# Patient Record
Sex: FEMALE | Race: WHITE | NOT HISPANIC OR LATINO | Employment: OTHER | URBAN - METROPOLITAN AREA
[De-identification: names, ages, dates, MRNs, and addresses within clinical notes are randomized per-mention and may not be internally consistent; named-entity substitution may affect disease eponyms.]

---

## 2020-08-05 ENCOUNTER — TRANSFERRED RECORDS (OUTPATIENT)
Dept: MAMMOGRAPHY | Facility: CLINIC | Age: 85
End: 2020-08-05

## 2020-08-07 ENCOUNTER — TRANSFERRED RECORDS (OUTPATIENT)
Dept: HEALTH INFORMATION MANAGEMENT | Facility: CLINIC | Age: 85
End: 2020-08-07

## 2020-08-07 ENCOUNTER — TRANSFERRED RECORDS (OUTPATIENT)
Dept: MAMMOGRAPHY | Facility: CLINIC | Age: 85
End: 2020-08-07

## 2020-08-12 ENCOUNTER — AMBULATORY - HEALTHEAST (OUTPATIENT)
Dept: SURGERY | Facility: CLINIC | Age: 85
End: 2020-08-12

## 2020-08-12 ENCOUNTER — AMBULATORY - HEALTHEAST (OUTPATIENT)
Dept: FAMILY MEDICINE | Facility: CLINIC | Age: 85
End: 2020-08-12

## 2020-08-12 DIAGNOSIS — Z11.59 ENCOUNTER FOR SCREENING FOR OTHER VIRAL DISEASES: ICD-10-CM

## 2020-08-12 LAB
SARS-COV-2 PCR COMMENT: NORMAL
SARS-COV-2 RNA SPEC QL NAA+PROBE: NEGATIVE
SARS-COV-2 VIRUS SPECIMEN SOURCE: NORMAL

## 2020-08-12 ASSESSMENT — MIFFLIN-ST. JEOR
SCORE: 1256.21
SCORE: 1251.68

## 2020-08-13 ENCOUNTER — SURGERY - HEALTHEAST (OUTPATIENT)
Dept: SURGERY | Facility: CLINIC | Age: 85
End: 2020-08-13

## 2020-08-13 ENCOUNTER — TRANSFERRED RECORDS (OUTPATIENT)
Dept: HEALTH INFORMATION MANAGEMENT | Facility: CLINIC | Age: 85
End: 2020-08-13

## 2020-08-13 ENCOUNTER — COMMUNICATION - HEALTHEAST (OUTPATIENT)
Dept: SCHEDULING | Facility: CLINIC | Age: 85
End: 2020-08-13

## 2020-08-13 ENCOUNTER — ANESTHESIA - HEALTHEAST (OUTPATIENT)
Dept: SURGERY | Facility: CLINIC | Age: 85
End: 2020-08-13

## 2020-08-13 ASSESSMENT — MIFFLIN-ST. JEOR: SCORE: 1266.19

## 2020-08-16 ENCOUNTER — HOME CARE/HOSPICE - HEALTHEAST (OUTPATIENT)
Dept: HOME HEALTH SERVICES | Facility: HOME HEALTH | Age: 85
End: 2020-08-16

## 2020-08-16 ASSESSMENT — MIFFLIN-ST. JEOR: SCORE: 1265.29

## 2020-08-17 ENCOUNTER — RECORDS - HEALTHEAST (OUTPATIENT)
Dept: ADMINISTRATIVE | Facility: OTHER | Age: 85
End: 2020-08-17

## 2020-08-20 ENCOUNTER — HOME CARE/HOSPICE - HEALTHEAST (OUTPATIENT)
Dept: HOME HEALTH SERVICES | Facility: HOME HEALTH | Age: 85
End: 2020-08-20

## 2020-08-20 ENCOUNTER — RECORDS - HEALTHEAST (OUTPATIENT)
Dept: LAB | Facility: CLINIC | Age: 85
End: 2020-08-20

## 2020-08-20 LAB
AST SERPL W P-5'-P-CCNC: 14 U/L (ref 0–40)
BASOPHILS # BLD AUTO: 0 THOU/UL (ref 0–0.2)
BASOPHILS NFR BLD AUTO: 0 % (ref 0–2)
BUN SERPL-MCNC: 9 MG/DL (ref 8–28)
C REACTIVE PROTEIN LHE: 3.3 MG/DL (ref 0–0.8)
CREAT SERPL-MCNC: 0.76 MG/DL (ref 0.6–1.1)
EOSINOPHIL # BLD AUTO: 0.2 THOU/UL (ref 0–0.4)
EOSINOPHIL NFR BLD AUTO: 2 % (ref 0–6)
ERYTHROCYTE [DISTWIDTH] IN BLOOD BY AUTOMATED COUNT: 12.7 % (ref 11–14.5)
ERYTHROCYTE [SEDIMENTATION RATE] IN BLOOD BY WESTERGREN METHOD: 58 MM/HR (ref 0–20)
GFR SERPL CREATININE-BSD FRML MDRD: >60 ML/MIN/1.73M2
HCT VFR BLD AUTO: 39.8 % (ref 35–47)
HGB BLD-MCNC: 12.7 G/DL (ref 12–16)
IMM GRANULOCYTES # BLD: 0.1 THOU/UL
IMM GRANULOCYTES NFR BLD: 1 %
LYMPHOCYTES # BLD AUTO: 1.6 THOU/UL (ref 0.8–4.4)
LYMPHOCYTES NFR BLD AUTO: 18 % (ref 20–40)
MCH RBC QN AUTO: 28.1 PG (ref 27–34)
MCHC RBC AUTO-ENTMCNC: 31.9 G/DL (ref 32–36)
MCV RBC AUTO: 88 FL (ref 80–100)
MONOCYTES # BLD AUTO: 1.1 THOU/UL (ref 0–0.9)
MONOCYTES NFR BLD AUTO: 12 % (ref 2–10)
NEUTROPHILS # BLD AUTO: 6.2 THOU/UL (ref 2–7.7)
NEUTROPHILS NFR BLD AUTO: 67 % (ref 50–70)
PLATELET # BLD AUTO: 288 THOU/UL (ref 140–440)
PMV BLD AUTO: 10.7 FL (ref 8.5–12.5)
RBC # BLD AUTO: 4.52 MILL/UL (ref 3.8–5.4)
WBC: 9.3 THOU/UL (ref 4–11)

## 2020-08-21 ENCOUNTER — HOME CARE/HOSPICE - HEALTHEAST (OUTPATIENT)
Dept: HOME HEALTH SERVICES | Facility: HOME HEALTH | Age: 85
End: 2020-08-21

## 2020-08-22 ENCOUNTER — HOME CARE/HOSPICE - HEALTHEAST (OUTPATIENT)
Dept: HOME HEALTH SERVICES | Facility: HOME HEALTH | Age: 85
End: 2020-08-22

## 2020-08-24 ENCOUNTER — HOME CARE/HOSPICE - HEALTHEAST (OUTPATIENT)
Dept: HOME HEALTH SERVICES | Facility: HOME HEALTH | Age: 85
End: 2020-08-24

## 2020-08-25 ENCOUNTER — HOME CARE/HOSPICE - HEALTHEAST (OUTPATIENT)
Dept: HOME HEALTH SERVICES | Facility: HOME HEALTH | Age: 85
End: 2020-08-25

## 2020-08-27 ENCOUNTER — HOME CARE/HOSPICE - HEALTHEAST (OUTPATIENT)
Dept: HOME HEALTH SERVICES | Facility: HOME HEALTH | Age: 85
End: 2020-08-27

## 2020-08-27 ENCOUNTER — RECORDS - HEALTHEAST (OUTPATIENT)
Dept: LAB | Facility: CLINIC | Age: 85
End: 2020-08-27

## 2020-08-27 LAB
AST SERPL W P-5'-P-CCNC: 12 U/L (ref 0–40)
BASOPHILS # BLD AUTO: 0.1 THOU/UL (ref 0–0.2)
BASOPHILS NFR BLD AUTO: 1 % (ref 0–2)
BUN SERPL-MCNC: 11 MG/DL (ref 8–28)
C REACTIVE PROTEIN LHE: 1.2 MG/DL (ref 0–0.8)
CREAT SERPL-MCNC: 0.74 MG/DL (ref 0.6–1.1)
EOSINOPHIL # BLD AUTO: 0.3 THOU/UL (ref 0–0.4)
EOSINOPHIL NFR BLD AUTO: 4 % (ref 0–6)
ERYTHROCYTE [DISTWIDTH] IN BLOOD BY AUTOMATED COUNT: 12.6 % (ref 11–14.5)
ERYTHROCYTE [SEDIMENTATION RATE] IN BLOOD BY WESTERGREN METHOD: 47 MM/HR (ref 0–20)
GFR SERPL CREATININE-BSD FRML MDRD: >60 ML/MIN/1.73M2
HCT VFR BLD AUTO: 42.6 % (ref 35–47)
HGB BLD-MCNC: 13.2 G/DL (ref 12–16)
IMM GRANULOCYTES # BLD: 0 THOU/UL
IMM GRANULOCYTES NFR BLD: 1 %
LYMPHOCYTES # BLD AUTO: 2.3 THOU/UL (ref 0.8–4.4)
LYMPHOCYTES NFR BLD AUTO: 28 % (ref 20–40)
MCH RBC QN AUTO: 27.7 PG (ref 27–34)
MCHC RBC AUTO-ENTMCNC: 31 G/DL (ref 32–36)
MCV RBC AUTO: 89 FL (ref 80–100)
MONOCYTES # BLD AUTO: 1 THOU/UL (ref 0–0.9)
MONOCYTES NFR BLD AUTO: 12 % (ref 2–10)
NEUTROPHILS # BLD AUTO: 4.6 THOU/UL (ref 2–7.7)
NEUTROPHILS NFR BLD AUTO: 55 % (ref 50–70)
PLATELET # BLD AUTO: 299 THOU/UL (ref 140–440)
PMV BLD AUTO: 10.3 FL (ref 8.5–12.5)
RBC # BLD AUTO: 4.77 MILL/UL (ref 3.8–5.4)
WBC: 8.4 THOU/UL (ref 4–11)

## 2020-08-28 ENCOUNTER — HOME CARE/HOSPICE - HEALTHEAST (OUTPATIENT)
Dept: HOME HEALTH SERVICES | Facility: HOME HEALTH | Age: 85
End: 2020-08-28

## 2020-08-29 ENCOUNTER — HOME CARE/HOSPICE - HEALTHEAST (OUTPATIENT)
Dept: HOME HEALTH SERVICES | Facility: HOME HEALTH | Age: 85
End: 2020-08-29

## 2020-08-31 ENCOUNTER — HOME CARE/HOSPICE - HEALTHEAST (OUTPATIENT)
Dept: HOME HEALTH SERVICES | Facility: HOME HEALTH | Age: 85
End: 2020-08-31

## 2020-09-02 ENCOUNTER — HOME CARE/HOSPICE - HEALTHEAST (OUTPATIENT)
Dept: HOME HEALTH SERVICES | Facility: HOME HEALTH | Age: 85
End: 2020-09-02

## 2020-09-03 ENCOUNTER — HOME CARE/HOSPICE - HEALTHEAST (OUTPATIENT)
Dept: HOME HEALTH SERVICES | Facility: HOME HEALTH | Age: 85
End: 2020-09-03

## 2020-09-03 ENCOUNTER — RECORDS - HEALTHEAST (OUTPATIENT)
Dept: LAB | Facility: CLINIC | Age: 85
End: 2020-09-03

## 2020-09-03 LAB
AST SERPL W P-5'-P-CCNC: 11 U/L (ref 0–40)
BASOPHILS # BLD AUTO: 0.1 THOU/UL (ref 0–0.2)
BASOPHILS NFR BLD AUTO: 1 % (ref 0–2)
BUN SERPL-MCNC: 12 MG/DL (ref 8–28)
C REACTIVE PROTEIN LHE: 0.6 MG/DL (ref 0–0.8)
CREAT SERPL-MCNC: 0.82 MG/DL (ref 0.6–1.1)
EOSINOPHIL # BLD AUTO: 0.2 THOU/UL (ref 0–0.4)
EOSINOPHIL NFR BLD AUTO: 4 % (ref 0–6)
ERYTHROCYTE [DISTWIDTH] IN BLOOD BY AUTOMATED COUNT: 12.6 % (ref 11–14.5)
ERYTHROCYTE [SEDIMENTATION RATE] IN BLOOD BY WESTERGREN METHOD: 41 MM/HR (ref 0–20)
GFR SERPL CREATININE-BSD FRML MDRD: >60 ML/MIN/1.73M2
HCT VFR BLD AUTO: 39.6 % (ref 35–47)
HGB BLD-MCNC: 12.6 G/DL (ref 12–16)
IMM GRANULOCYTES # BLD: 0 THOU/UL
IMM GRANULOCYTES NFR BLD: 1 %
LYMPHOCYTES # BLD AUTO: 1.8 THOU/UL (ref 0.8–4.4)
LYMPHOCYTES NFR BLD AUTO: 28 % (ref 20–40)
MCH RBC QN AUTO: 28 PG (ref 27–34)
MCHC RBC AUTO-ENTMCNC: 31.8 G/DL (ref 32–36)
MCV RBC AUTO: 88 FL (ref 80–100)
MONOCYTES # BLD AUTO: 1.2 THOU/UL (ref 0–0.9)
MONOCYTES NFR BLD AUTO: 19 % (ref 2–10)
NEUTROPHILS # BLD AUTO: 3 THOU/UL (ref 2–7.7)
NEUTROPHILS NFR BLD AUTO: 48 % (ref 50–70)
PLATELET # BLD AUTO: 322 THOU/UL (ref 140–440)
PMV BLD AUTO: 10.6 FL (ref 8.5–12.5)
RBC # BLD AUTO: 4.5 MILL/UL (ref 3.8–5.4)
WBC: 6.3 THOU/UL (ref 4–11)

## 2020-09-04 ENCOUNTER — COMMUNICATION - HEALTHEAST (OUTPATIENT)
Dept: INFECTIOUS DISEASES | Facility: CLINIC | Age: 85
End: 2020-09-04

## 2020-09-04 ENCOUNTER — HOME CARE/HOSPICE - HEALTHEAST (OUTPATIENT)
Dept: HOME HEALTH SERVICES | Facility: HOME HEALTH | Age: 85
End: 2020-09-04

## 2020-09-09 ENCOUNTER — HOME CARE/HOSPICE - HEALTHEAST (OUTPATIENT)
Dept: HOME HEALTH SERVICES | Facility: HOME HEALTH | Age: 85
End: 2020-09-09

## 2020-09-10 ENCOUNTER — RECORDS - HEALTHEAST (OUTPATIENT)
Dept: LAB | Facility: CLINIC | Age: 85
End: 2020-09-10

## 2020-09-10 ENCOUNTER — HOME CARE/HOSPICE - HEALTHEAST (OUTPATIENT)
Dept: HOME HEALTH SERVICES | Facility: HOME HEALTH | Age: 85
End: 2020-09-10

## 2020-09-10 LAB
AST SERPL W P-5'-P-CCNC: 13 U/L (ref 0–40)
BASOPHILS # BLD AUTO: 0.1 THOU/UL (ref 0–0.2)
BASOPHILS NFR BLD AUTO: 1 % (ref 0–2)
BUN SERPL-MCNC: 11 MG/DL (ref 8–28)
C REACTIVE PROTEIN LHE: 0.6 MG/DL (ref 0–0.8)
CREAT SERPL-MCNC: 0.77 MG/DL (ref 0.6–1.1)
EOSINOPHIL # BLD AUTO: 0.2 THOU/UL (ref 0–0.4)
EOSINOPHIL NFR BLD AUTO: 3 % (ref 0–6)
ERYTHROCYTE [DISTWIDTH] IN BLOOD BY AUTOMATED COUNT: 12.8 % (ref 11–14.5)
ERYTHROCYTE [SEDIMENTATION RATE] IN BLOOD BY WESTERGREN METHOD: 48 MM/HR (ref 0–20)
GFR SERPL CREATININE-BSD FRML MDRD: >60 ML/MIN/1.73M2
HCT VFR BLD AUTO: 39.7 % (ref 35–47)
HGB BLD-MCNC: 12.3 G/DL (ref 12–16)
IMM GRANULOCYTES # BLD: 0 THOU/UL
IMM GRANULOCYTES NFR BLD: 0 %
LYMPHOCYTES # BLD AUTO: 2.3 THOU/UL (ref 0.8–4.4)
LYMPHOCYTES NFR BLD AUTO: 32 % (ref 20–40)
MCH RBC QN AUTO: 27.2 PG (ref 27–34)
MCHC RBC AUTO-ENTMCNC: 31 G/DL (ref 32–36)
MCV RBC AUTO: 88 FL (ref 80–100)
MONOCYTES # BLD AUTO: 1.1 THOU/UL (ref 0–0.9)
MONOCYTES NFR BLD AUTO: 15 % (ref 2–10)
NEUTROPHILS # BLD AUTO: 3.5 THOU/UL (ref 2–7.7)
NEUTROPHILS NFR BLD AUTO: 49 % (ref 50–70)
PLATELET # BLD AUTO: 302 THOU/UL (ref 140–440)
PMV BLD AUTO: 10.7 FL (ref 8.5–12.5)
RBC # BLD AUTO: 4.52 MILL/UL (ref 3.8–5.4)
WBC: 7.2 THOU/UL (ref 4–11)

## 2020-09-11 ENCOUNTER — HOME CARE/HOSPICE - HEALTHEAST (OUTPATIENT)
Dept: HOME HEALTH SERVICES | Facility: HOME HEALTH | Age: 85
End: 2020-09-11

## 2020-09-14 ENCOUNTER — HOME CARE/HOSPICE - HEALTHEAST (OUTPATIENT)
Dept: HOME HEALTH SERVICES | Facility: HOME HEALTH | Age: 85
End: 2020-09-14

## 2020-09-21 ENCOUNTER — OFFICE VISIT - HEALTHEAST (OUTPATIENT)
Dept: INFECTIOUS DISEASES | Facility: CLINIC | Age: 85
End: 2020-09-21

## 2020-09-21 DIAGNOSIS — L08.9 LOCAL INFECTION OF WOUND: ICD-10-CM

## 2020-09-21 DIAGNOSIS — T14.8XXA LOCAL INFECTION OF WOUND: ICD-10-CM

## 2020-09-21 RX ORDER — TRAZODONE HYDROCHLORIDE 50 MG/1
TABLET, FILM COATED ORAL
Status: SHIPPED | COMMUNITY
Start: 2020-08-28 | End: 2022-09-01

## 2021-05-26 VITALS — SYSTOLIC BLOOD PRESSURE: 111 MMHG | TEMPERATURE: 97.2 F | DIASTOLIC BLOOD PRESSURE: 72 MMHG | HEART RATE: 89 BPM

## 2021-05-26 VITALS
OXYGEN SATURATION: 96 % | TEMPERATURE: 97.2 F | SYSTOLIC BLOOD PRESSURE: 132 MMHG | HEART RATE: 94 BPM | DIASTOLIC BLOOD PRESSURE: 84 MMHG

## 2021-05-26 VITALS
SYSTOLIC BLOOD PRESSURE: 121 MMHG | TEMPERATURE: 97.7 F | DIASTOLIC BLOOD PRESSURE: 82 MMHG | HEART RATE: 87 BPM | OXYGEN SATURATION: 98 %

## 2021-05-26 VITALS — DIASTOLIC BLOOD PRESSURE: 94 MMHG | TEMPERATURE: 97.3 F | HEART RATE: 76 BPM | SYSTOLIC BLOOD PRESSURE: 162 MMHG

## 2021-05-26 VITALS
OXYGEN SATURATION: 97 % | TEMPERATURE: 98.2 F | SYSTOLIC BLOOD PRESSURE: 158 MMHG | HEART RATE: 86 BPM | DIASTOLIC BLOOD PRESSURE: 78 MMHG

## 2021-05-26 VITALS — HEART RATE: 94 BPM | SYSTOLIC BLOOD PRESSURE: 105 MMHG | DIASTOLIC BLOOD PRESSURE: 67 MMHG

## 2021-05-26 VITALS
OXYGEN SATURATION: 94 % | DIASTOLIC BLOOD PRESSURE: 92 MMHG | SYSTOLIC BLOOD PRESSURE: 142 MMHG | RESPIRATION RATE: 16 BRPM | TEMPERATURE: 98.4 F | HEART RATE: 88 BPM

## 2021-05-26 VITALS — SYSTOLIC BLOOD PRESSURE: 128 MMHG | HEART RATE: 81 BPM | DIASTOLIC BLOOD PRESSURE: 84 MMHG

## 2021-05-27 VITALS
TEMPERATURE: 96.9 F | HEART RATE: 76 BPM | DIASTOLIC BLOOD PRESSURE: 80 MMHG | OXYGEN SATURATION: 94 % | SYSTOLIC BLOOD PRESSURE: 128 MMHG

## 2021-05-27 VITALS
HEART RATE: 74 BPM | DIASTOLIC BLOOD PRESSURE: 92 MMHG | TEMPERATURE: 96.4 F | OXYGEN SATURATION: 96 % | RESPIRATION RATE: 16 BRPM | SYSTOLIC BLOOD PRESSURE: 182 MMHG

## 2021-05-27 VITALS
DIASTOLIC BLOOD PRESSURE: 73 MMHG | TEMPERATURE: 96.3 F | HEART RATE: 93 BPM | RESPIRATION RATE: 16 BRPM | OXYGEN SATURATION: 95 % | SYSTOLIC BLOOD PRESSURE: 117 MMHG

## 2021-05-27 VITALS
OXYGEN SATURATION: 98 % | DIASTOLIC BLOOD PRESSURE: 85 MMHG | TEMPERATURE: 81 F | HEART RATE: 98 BPM | SYSTOLIC BLOOD PRESSURE: 144 MMHG

## 2021-05-27 VITALS
DIASTOLIC BLOOD PRESSURE: 78 MMHG | TEMPERATURE: 97.6 F | OXYGEN SATURATION: 96 % | SYSTOLIC BLOOD PRESSURE: 130 MMHG | HEART RATE: 80 BPM

## 2021-05-27 VITALS
HEART RATE: 82 BPM | OXYGEN SATURATION: 94 % | DIASTOLIC BLOOD PRESSURE: 88 MMHG | TEMPERATURE: 96.5 F | SYSTOLIC BLOOD PRESSURE: 140 MMHG

## 2021-05-27 VITALS
OXYGEN SATURATION: 95 % | TEMPERATURE: 96.3 F | RESPIRATION RATE: 20 BRPM | HEART RATE: 73 BPM | DIASTOLIC BLOOD PRESSURE: 82 MMHG | SYSTOLIC BLOOD PRESSURE: 132 MMHG

## 2021-06-04 VITALS — WEIGHT: 187 LBS | HEIGHT: 64 IN | BODY MASS INDEX: 31.92 KG/M2

## 2021-06-10 NOTE — ANESTHESIA CARE TRANSFER NOTE
Last vitals:   Vitals:    08/13/20 1811   BP: 114/56   Pulse: 70   Resp: 16   Temp: 36.9  C (98.4  F)   SpO2: 100%     Patient's level of consciousness is drowsy  Spontaneous respirations: yes  Maintains airway independently: yes  Dentition unchanged: yes  Oropharynx: oropharynx clear of all foreign objects    QCDR Measures:  ASA# 20 - Surgical Safety Checklist: WHO surgical safety checklist completed prior to induction    PQRS# 430 - Adult PONV Prevention: 4558F - Pt received => 2 anti-emetic agents (different classes) preop & intraop  ASA# 8 - Peds PONV Prevention: NA - Not pediatric patient, not GA or 2 or more risk factors NOT present  PQRS# 424 - Deanna-op Temp Management: 4559F - At least one body temp DOCUMENTED => 35.5C or 95.9F within required timeframe  PQRS# 426 - PACU Transfer Protocol: - Transfer of care checklist used  ASA# 14 - Acute Post-op Pain: ASA14B - Patient did NOT experience pain >= 7 out of 10

## 2021-06-10 NOTE — ANESTHESIA PREPROCEDURE EVALUATION
Anesthesia Evaluation      Patient summary reviewed   History of anesthetic complications (PONV)     Airway   Mallampati: II  Neck ROM: full   Pulmonary - negative ROS and normal exam   (+) asthma  mild,  well controlled, sleep apnea on no CPAP, ,                          Cardiovascular - negative ROS and normal exam  (+) hypertension well controlled, ,      Neuro/Psych - negative ROS     Endo/Other - negative ROS   (+) arthritis, obesity (BMI 32),      GI/Hepatic/Renal - negative ROS   (+) GERD well controlled,             Dental - normal exam   (+) poor dentition                       Anesthesia Plan  Planned anesthetic: general endotracheal    ASA 2   Induction: intravenous   Anesthetic plan and risks discussed with: patient    Post-op plan: routine recovery

## 2021-06-10 NOTE — ANESTHESIA POSTPROCEDURE EVALUATION
Patient: Joyce Brian  Procedure(s):  INCISION AND DRAINAGE ACHILLES TENDON, RIGHT ANKLE (Right)  Anesthesia type: general    Patient location: PACU  Last vitals:   Vitals Value Taken Time   /67 8/13/2020  6:40 PM   Temp 36.9  C (98.4  F) 8/13/2020  6:11 PM   Pulse 65 8/13/2020  6:41 PM   Resp 14 8/13/2020  6:41 PM   SpO2 96 % 8/13/2020  6:41 PM   Vitals shown include unvalidated device data.  Post vital signs: stable  Level of consciousness: awake and responds to simple questions  Post-anesthesia pain: pain controlled  Post-anesthesia nausea and vomiting: no  Pulmonary: unassisted, return to baseline  Cardiovascular: stable and blood pressure at baseline  Hydration: adequate  Anesthetic events: no    QCDR Measures:  ASA# 11 - Deanna-op Cardiac Arrest: ASA11B - Patient did NOT experience unanticipated cardiac arrest  ASA# 12 - Deanna-op Mortality Rate: ASA12B - Patient did NOT die  ASA# 13 - PACU Re-Intubation Rate: ASA13B - Patient did NOT require a new airway mgmt  ASA# 10 - Composite Anes Safety: ASA10A - No serious adverse event    Additional Notes:

## 2021-06-11 NOTE — TELEPHONE ENCOUNTER
09/08 - called x 1, rings, no answer, will try again later.        Per another encounter, pt states she does not need to follow up per surgery

## 2021-06-11 NOTE — TELEPHONE ENCOUNTER
Date: 9/21/2020 Status: Laura   Time: 3:30 PM Length: 30   Visit Type: TELEPHONE VISIT RETURN [[id number]] Copay: $0.00   Provider: Zuly Barreto MD       Pt has questions about her infusions. If the are coming out to check on her, etc.   please call pt back.

## 2021-06-11 NOTE — TELEPHONE ENCOUNTER
Let's continue ceftriaxone and weekly labs until Dr. Barreto can assess next week. Please check with her on 9/8.     Manish Oreilly

## 2021-06-11 NOTE — TELEPHONE ENCOUNTER
I called LM informing [facility] that the end date for ceftriaxone is 9/14, per Dr Barreto. Pt also need a podiatry/ortho f/u. I called the pt, she states she has a few f/u's with Dr Rodríguez after surgery and he said she does not need to f/u.

## 2021-06-11 NOTE — TELEPHONE ENCOUNTER
SCOTTHI calling regarding end of therapy for this pt, which is scheduled for 9/6. Pt was to f/u with Dr Barreto 2-3 weeks from 8/16, this was missed. Please advise if 9/6 end date is ok and if PICC can be pulled so I can call Josephine back at [phone].

## 2021-06-11 NOTE — PATIENT INSTRUCTIONS - HE
Ranulfo Cook,  Thank you for taking the time to talk with us over the phone for Telephone Visit today. Please continue taking excellent care of the foot and wear the correct footwear. Please connect with Podiatry for recommendations regarding correct footwear after Achilles tendon surgery    Zuly Barreto MD  [facility]  [phone]

## 2021-06-11 NOTE — PROGRESS NOTES
"Service: Infectious Disease   Note: Progress Note- [facility] Telephone Visit  Date: 9/21/2020      ASSESSMENT:  Achilles rupture, repair and infection, abscess- S/P I&D and wound healed.      Previous cultures:   4+ Escherichia fergusonii  3+ Escherichia hermanii       Achilles tendon infection, right, status post drainage and debridement on 8/13/2020  E. coli infection.  Patient completed IV antibiotic therapy~4 weeks, ended around 9/14/2020. Wound healed     History of tendon rupture in the past and ongoing open drainage, abscess on MRI  Polymyalgia rheumatica  Mild intermittent asthma     Allergies/side effects: Levofloxacin (tendon rupture risk with quinolones), Ciprofloxacin, Doxycycline, and Sulfa.      Principal Problem:    Achilles tendon infection  Active Problems:    Polymyalgia rheumatica (H)    Mild intermittent asthma    Hypertension     PLAN:       Follow-up with podiatrist surgery as needed  Patient completed antibiotic therapy  Foot care, and Podiatry follow up     Allergy referral to diagnose antibiotic allergy vs side effects  Discussed  with patient via phone visit     ID will sign off. Please call with questions      Zuly Barreto MD  [facility]  [phone]      Joyce Brian is a 86 y.o. female who is being evaluated via a billable telephone visit.      The patient has been notified of following:     \"This telephone visit will be conducted via a call between you and your physician/provider. We have found that certain health care needs can be provided without the need for a physical exam.  This service lets us provide the care you need with a short phone conversation.  If a prescription is necessary we can send it directly to your pharmacy.  If lab work is needed we can place an order for that and you can then stop by our lab to have the test done at a later time.    Telephone visits are billed at different rates depending on your insurance coverage. During this " "emergency period, for some insurers they may be billed the same as an in-person visit.  Please reach out to your insurance provider with any questions.    If during the course of the call the physician/provider feels a telephone visit is not appropriate, you will not be charged for this service.\"    Patient has given verbal consent to a Telephone visit? Yes    What phone number would you like to be contacted at? [phone]    Phone call duration: 15 minutes    Few Call Attempts 3:30. Patient connected around 3:35 pm- 3: 48 pm    Total Time Spent 20 minutes with >50% of the time spent in counseling, education and care coordination.       Zuly Barreto MD  [facility]  [phone]        ________________________________________________________________________    Notes / labs / vitals reviewed.    SUBJECTIVE / INTERVAL HISTORY: Doing well  Was at Primary care doctor appointment today.   Foot /Achilles area wound healed  Completed antibiotic treatment    ROS: A complete 12 point ROS is negative except as listed above.    PMHx/FHx/SHx: Reviewed. Interval changes noted.        OBJECTIVE:  There were no vitals filed for this visit.    Telephone Visit:  Gen: coherent  Neuro: able to answer questions appropriately     Antibiotics:  Ceftriaxone- completed 9/14/2020 (~ 4 weeks)      Pertinent labs:    Reviewed  CRP normal      MICROBIOLOGY DATA:    Cultures reviewed  Contains abnormal data  Culture/Gram Stain: Tissue     Specimen Information:  Foot, Right; Tissue          Culture  4+ Escherichia fergusoniiAbnormal      Susceptibility testing done on previous culture    3+ Escherichia coliAbnormal         3+ Escherichia hermaniiAbnormal                 Gram Stain Result   No polymorphonuclear leukocytes seen       4+ Gram negative bacilli             Resulting Agency: [facility]    Susceptibility      Escherichia coli  Escherichia hermanii      FELICIA  FELICIA      Amoxicillin + Clavulanate  <=4/2   Sensitive  " <=4/2   Sensitive      Ampicillin  >16   Resistant  >16   Resistant      Cefazolin  <=1   Sensitive  2   Sensitive      Cefepime  <=1   Sensitive  <=1   Sensitive      Ceftriaxone  <=1   Sensitive  <=1   Sensitive      Ciprofloxacin  <=0.5   Sensitive  <=0.5   Sensitive      Gentamicin  <=2   Sensitive  <=2   Sensitive      Levofloxacin  <=1   Sensitive  <=1   Sensitive      Meropenem  <=0.25   Sensitive  <=0.25   Sensitive      Piperacillin + Tazobactam  4/4   Sensitive  8/4   Sensitive      Tetracycline  <=2   Sensitive  <=2   Sensitive      Tobramycin  <=2   Sensitive  <=2   Sensitive      Trimethoprim + Sulfamethoxazole  <=0.5   Sensitive  <=0.5   Sensitive                    Specimen Collected: 08/13/20 17:40  Last Resulted: 08/17/20 09:31              IMAGING/RADIOLOGY:  Reviewed

## 2021-06-11 NOTE — PROGRESS NOTES
Pt today states that she is having increased pain in her L foot on the top just anterior to the lateral malleoli that it makes it very difficult for the pt to amb. Pt states that the pain started yesterday afternoon.  Pt is not amb on the knee walker anymore due to the pain in her knee and pt is currently using a 4ww for amb when needed.     If you have any questions or concern please let me know.

## 2021-06-11 NOTE — TELEPHONE ENCOUNTER
"Dr Barreto Patient    Per juan a, \"This pt will need a f/u  2-3 wks from Dr Barreto 8/16 note\"    She needs a f/u ASAP.  "

## 2021-06-16 PROBLEM — M65.10 ACHILLES TENDON INFECTION: Status: ACTIVE | Noted: 2020-08-14

## 2022-05-04 ENCOUNTER — TRANSFERRED RECORDS (OUTPATIENT)
Dept: HEALTH INFORMATION MANAGEMENT | Facility: CLINIC | Age: 87
End: 2022-05-04

## 2022-05-13 ENCOUNTER — TRANSFERRED RECORDS (OUTPATIENT)
Dept: HEALTH INFORMATION MANAGEMENT | Facility: CLINIC | Age: 87
End: 2022-05-13

## 2022-06-06 ENCOUNTER — TRANSFERRED RECORDS (OUTPATIENT)
Dept: HEALTH INFORMATION MANAGEMENT | Facility: CLINIC | Age: 87
End: 2022-06-06

## 2022-07-11 ENCOUNTER — TELEPHONE (OUTPATIENT)
Dept: INFECTIOUS DISEASES | Facility: CLINIC | Age: 87
End: 2022-07-11

## 2022-07-11 NOTE — TELEPHONE ENCOUNTER
Patient called. She saw Dr. Barreto about 2 years ago for an infection on her achilles. The infection is back and her surgeon would like for her to have surgery, but she is refusing surgery. She wants Dr. Barreto to treat the infection. Please advise on if she can schedule an appointment with Dr. Barreto.

## 2022-07-11 NOTE — TELEPHONE ENCOUNTER
It does not sound like it. She said that she met with her PCP and was just told to go back to see ID and the surgeon.

## 2022-07-13 ENCOUNTER — MEDICAL CORRESPONDENCE (OUTPATIENT)
Dept: HEALTH INFORMATION MANAGEMENT | Facility: CLINIC | Age: 87
End: 2022-07-13

## 2022-07-20 NOTE — TELEPHONE ENCOUNTER
5/18/22 note from Dr Rojo at  notes pt has non healing achilles wound, continues to drain. Pt previously seen Dr Barreto in 2020 for this infection.     Dr Rojo notes pt possibly had MRI at [facility], Dr Rodríguez office. Please get this imaging report

## 2022-07-20 NOTE — TELEPHONE ENCOUNTER
07.20 left message for medical records with [facility] to have records faxed over, Phone- [phone]

## 2022-07-29 NOTE — TELEPHONE ENCOUNTER
Spoke w/patient, she said that the MRI didn't really have anything to do with her heel. She already tried calling [facility] to have report faxed.   Do we have enough records to schedule an appointment?

## 2022-07-29 NOTE — TELEPHONE ENCOUNTER
Spoke w/patient, she said that no labs, cultures or imaging were performed with Dr. Rodríguez. She saw Dr. Rodríguez in the office, so just office notes. She will contact Dr. Rodríguez's office to have the office notes fax to ID.

## 2022-07-29 NOTE — TELEPHONE ENCOUNTER
I believe Dr Rodríguez is the pt's podiatrist and we should obtain records from there so we have all of the details, I would request imaging, if any of the achilles, recent cultures, and OV notes.

## 2022-08-01 ENCOUNTER — LAB (OUTPATIENT)
Dept: LAB | Facility: CLINIC | Age: 87
End: 2022-08-01

## 2022-08-01 ENCOUNTER — OFFICE VISIT (OUTPATIENT)
Dept: INFECTIOUS DISEASES | Facility: CLINIC | Age: 87
End: 2022-08-01
Payer: COMMERCIAL

## 2022-08-01 VITALS
SYSTOLIC BLOOD PRESSURE: 136 MMHG | HEART RATE: 76 BPM | TEMPERATURE: 98.1 F | BODY MASS INDEX: 33.48 KG/M2 | DIASTOLIC BLOOD PRESSURE: 82 MMHG | WEIGHT: 192 LBS

## 2022-08-01 DIAGNOSIS — B37.9 YEAST INFECTION: ICD-10-CM

## 2022-08-01 DIAGNOSIS — B99.9 INFECTION: ICD-10-CM

## 2022-08-01 DIAGNOSIS — M65.10 ACHILLES TENDON INFECTION: ICD-10-CM

## 2022-08-01 DIAGNOSIS — B99.9 INFECTION: Primary | ICD-10-CM

## 2022-08-01 DIAGNOSIS — L08.9 LOCAL INFECTION OF WOUND: ICD-10-CM

## 2022-08-01 DIAGNOSIS — T14.8XXA LOCAL INFECTION OF WOUND: ICD-10-CM

## 2022-08-01 LAB
ALBUMIN SERPL BCG-MCNC: 3.8 G/DL (ref 3.5–5.2)
ALP SERPL-CCNC: 87 U/L (ref 35–104)
ALT SERPL W P-5'-P-CCNC: 18 U/L (ref 10–35)
ANION GAP SERPL CALCULATED.3IONS-SCNC: 9 MMOL/L (ref 7–15)
AST SERPL W P-5'-P-CCNC: 20 U/L (ref 10–35)
BASOPHILS # BLD AUTO: 0 10E3/UL (ref 0–0.2)
BASOPHILS NFR BLD AUTO: 1 %
BILIRUB SERPL-MCNC: 0.6 MG/DL
BUN SERPL-MCNC: 11.2 MG/DL (ref 8–23)
CALCIUM SERPL-MCNC: 9.9 MG/DL (ref 8.8–10.2)
CHLORIDE SERPL-SCNC: 104 MMOL/L (ref 98–107)
CREAT SERPL-MCNC: 0.83 MG/DL (ref 0.51–0.95)
CRP SERPL-MCNC: 4.4 MG/L
DEPRECATED HCO3 PLAS-SCNC: 25 MMOL/L (ref 22–29)
EOSINOPHIL # BLD AUTO: 0 10E3/UL (ref 0–0.7)
EOSINOPHIL NFR BLD AUTO: 0 %
ERYTHROCYTE [DISTWIDTH] IN BLOOD BY AUTOMATED COUNT: 14.2 % (ref 10–15)
ERYTHROCYTE [SEDIMENTATION RATE] IN BLOOD BY WESTERGREN METHOD: 43 MM/HR (ref 0–20)
GFR SERPL CREATININE-BSD FRML MDRD: 67 ML/MIN/1.73M2
GLUCOSE SERPL-MCNC: 109 MG/DL (ref 70–99)
HCT VFR BLD AUTO: 40 % (ref 35–47)
HGB BLD-MCNC: 12.5 G/DL (ref 11.7–15.7)
IMM GRANULOCYTES # BLD: 0 10E3/UL
IMM GRANULOCYTES NFR BLD: 0 %
LYMPHOCYTES # BLD AUTO: 1.7 10E3/UL (ref 0.8–5.3)
LYMPHOCYTES NFR BLD AUTO: 20 %
MCH RBC QN AUTO: 25.9 PG (ref 26.5–33)
MCHC RBC AUTO-ENTMCNC: 31.3 G/DL (ref 31.5–36.5)
MCV RBC AUTO: 83 FL (ref 78–100)
MONOCYTES # BLD AUTO: 0.5 10E3/UL (ref 0–1.3)
MONOCYTES NFR BLD AUTO: 6 %
NEUTROPHILS # BLD AUTO: 6.3 10E3/UL (ref 1.6–8.3)
NEUTROPHILS NFR BLD AUTO: 74 %
PLATELET # BLD AUTO: 327 10E3/UL (ref 150–450)
POTASSIUM SERPL-SCNC: 4.7 MMOL/L (ref 3.4–5.3)
PROT SERPL-MCNC: 7.6 G/DL (ref 6.4–8.3)
RBC # BLD AUTO: 4.83 10E6/UL (ref 3.8–5.2)
SODIUM SERPL-SCNC: 138 MMOL/L (ref 136–145)
WBC # BLD AUTO: 8.6 10E3/UL (ref 4–11)

## 2022-08-01 PROCEDURE — 36415 COLL VENOUS BLD VENIPUNCTURE: CPT

## 2022-08-01 PROCEDURE — 87040 BLOOD CULTURE FOR BACTERIA: CPT

## 2022-08-01 PROCEDURE — 85652 RBC SED RATE AUTOMATED: CPT

## 2022-08-01 PROCEDURE — 86140 C-REACTIVE PROTEIN: CPT

## 2022-08-01 PROCEDURE — 80053 COMPREHEN METABOLIC PANEL: CPT

## 2022-08-01 PROCEDURE — 99215 OFFICE O/P EST HI 40 MIN: CPT | Performed by: INTERNAL MEDICINE

## 2022-08-01 PROCEDURE — 85025 COMPLETE CBC W/AUTO DIFF WBC: CPT

## 2022-08-01 RX ORDER — LOSARTAN POTASSIUM 50 MG/1
50 TABLET ORAL DAILY
COMMUNITY
Start: 2021-07-12

## 2022-08-01 RX ORDER — CEPHALEXIN 500 MG/1
CAPSULE ORAL
COMMUNITY
Start: 2022-07-29 | End: 2022-08-01

## 2022-08-01 RX ORDER — FLUCONAZOLE 200 MG/1
200 TABLET ORAL DAILY
Qty: 10 TABLET | Refills: 0 | Status: SHIPPED | OUTPATIENT
Start: 2022-08-01 | End: 2022-08-11

## 2022-08-01 RX ORDER — SUMATRIPTAN 50 MG/1
50 TABLET, FILM COATED ORAL
COMMUNITY
End: 2022-09-02

## 2022-08-01 RX ORDER — LORATADINE 10 MG/1
10 TABLET ORAL
COMMUNITY
End: 2022-09-02

## 2022-08-01 RX ORDER — NYSTATIN 100000 [USP'U]/G
POWDER TOPICAL
COMMUNITY
Start: 2022-05-18 | End: 2022-09-02

## 2022-08-01 RX ORDER — PREDNISONE 5 MG/1
2.5 TABLET ORAL DAILY
Status: ON HOLD | COMMUNITY
End: 2022-09-06

## 2022-08-01 RX ORDER — CEPHALEXIN 500 MG/1
500 CAPSULE ORAL 3 TIMES DAILY
Qty: 63 CAPSULE | Refills: 0 | Status: SHIPPED | OUTPATIENT
Start: 2022-08-01 | End: 2022-08-01

## 2022-08-01 RX ORDER — HYDROCORTISONE 2.5 %
CREAM (GRAM) TOPICAL
COMMUNITY
Start: 2022-05-18 | End: 2022-09-02

## 2022-08-01 RX ORDER — HYDROXYCHLOROQUINE SULFATE 200 MG/1
200 TABLET, FILM COATED ORAL DAILY
COMMUNITY
Start: 2022-03-23 | End: 2023-03-23

## 2022-08-01 RX ORDER — GABAPENTIN 100 MG/1
200 CAPSULE ORAL AT BEDTIME
COMMUNITY
Start: 2022-07-08

## 2022-08-01 RX ORDER — FLUTICASONE PROPIONATE 50 MCG
1 SPRAY, SUSPENSION (ML) NASAL
COMMUNITY
End: 2022-09-02

## 2022-08-01 NOTE — PROGRESS NOTES
Infectious Disease Consultation     Date of Consult (When I saw the patient): 08/01/22    Assessment & Plan   Joyce Brian is a 88 year old female who presents for consultation for Achilles tendon    Impression:    1. Patient has history of Achilles tendon rupture, status post surgery  2. History of Achilles tendon infection, was treated with IV ceftriaxone in 2020 for E. coli infection  3. Polymyalgia rheumatica  4. Mild intermittent asthma  5. Allergy side effects: Levofloxacin tendon rupture risk with quinolones, ciprofloxacin allergy Doxy allergy and sulfa allergy  6. Had mild trauma to the Achilles tendon about 1 year ago, patient did not seek medical care, intermittent low-grade drainage and swelling, seen by Dr. Rodríguez in [facility] orthopedic in May 2022, and due to chronic infection surgical intervention, I&D was recommended.  Patient did not wish to proceed with surgery at this time due to her advanced age and prolonged healing and other concerns.  Referred to infectious disease for antibiotics  7. No fevers, no systemic symptoms however we do not have any culture results to guide antibiotic therapy at this time  8. Tinea corporis, pruritic on abdominal wall    Taken with patient's permission today: Right Achilles tendon region with swelling, no drainage        Recommendations    We discussed test results that we need to do today(blood tests) and start Augmentin (antibiotic).     MRI R ankle/achilles    Fluconazole for skin yeast infection    Follow up in 1-2 weeks. If there is worsening of swelling, drainage, then go to ER.     Please call with questions.    Zuly Barreto MD  [facility]  [phone]      Zuly Barreto MD, MD    Reason for Consult   Reason for consult: I was asked to evaluate this patient for right Achilles tendon infection    Primary Care Physician   Marilu Rojo    Chief Complaint   Chronic Achilles tendon region infection    History is  obtained from the patient and medical records    History of Present Illness   Joyce Brian is a 88 year old female who presents with    Achilles infection in 2020, treated with IV antibiotics   Completed, the wound improved.   Wore some new shoes, opened up again 1.5 year ago.   Drains every days. Didn't see anyone until May 2022. Ruptures Achilles several years ago.    No fever, no chills.   Does not want another surgery    Fatigue    Past Medical History   I have reviewed this patient's medical history and updated it with pertinent information if needed.   Past Medical History:   Diagnosis Date     Acid reflux      Anemia      Arthritis      BPPV (benign paroxysmal positional vertigo)      History of anesthesia complications      Hypertension      Lung nodules      Mild intermittent asthma      Polymyalgia rheumatica (H)      PONV (postoperative nausea and vomiting)      Restless leg syndrome      Sjogren's syndrome (H)      Sleep apnea      TIA (transient ischemic attack) 2012       Past Surgical History   I have reviewed this patient's surgical history and updated it with pertinent information if needed.  Past Surgical History:   Procedure Laterality Date     ANKLE SURGERY       BLADDER SURGERY       HYSTERECTOMY       INCISION AND DRAINAGE OF WOUND Right 8/13/2020    Procedure: INCISION AND DRAINAGE ACHILLES TENDON, RIGHT ANKLE;  Surgeon: Chan Rodríguez DPM;  Location: [facility];  Service: Podiatry     JOINT REPLACEMENT Bilateral     hips and knees     PICC  8/16/2020          RELEASE CARPAL TUNNEL       TONSILLECTOMY         Prior to Admission Medications   Cannot display prior to admission medications because the patient has not been admitted in this contact.     Allergies   Allergies   Allergen Reactions     Amlodipine Cough     Ciprofloxacin Other (See Comments)     Tendon injury     Doxycycline Nausea and Vomiting     Duloxetine Nausea     Duloxetine Hcl [Duloxetine] Other (See Comments)      Nausea, even with low dose     Hydroxychloroquine Nausea and Vomiting     Levofloxacin Nausea and Vomiting     Nitrofurantoin Nausea and Vomiting     Phenothiazines Other (See Comments)     Worsening of restless legs     Strawberry Hives     Sulfa (Sulfonamide Antibiotics) [Sulfa Drugs] Unknown     Walnuts [[place] Pollen Allergy Skin Test] Hives     Morphine Rash       Immunization History   Immunization History   Administered Date(s) Administered     COVID-19,PF,Moderna 01/18/2021, 02/15/2021, 11/05/2021, 06/09/2022       Social History   I have reviewed this patient's social history and updated it with pertinent information if needed. Joyce Brian  reports that she has never smoked. She has never used smokeless tobacco. She reports previous alcohol use. She reports that she does not use drugs.    Family History   I have reviewed this patient's family history and updated it with pertinent information if needed.   Family History   Problem Relation Age of Onset     Breast Cancer Mother        Review of Systems   The 10 point Review of Systems is negative other than noted in the HPI or here.     Physical Exam   Temp: 98.1  F (36.7  C)   BP: 136/82 Pulse: 76            Vital Signs with Ranges  Temp:  [98.1  F (36.7  C)] 98.1  F (36.7  C)  Pulse:  [76] 76  BP: (136)/(82) 136/82  192 lbs 0 oz  Body mass index is 33.48 kg/m .    GENERAL APPEARANCE:  awake  EYES: Eyes grossly normal to inspection, PERRL and conjunctivae and sclerae normal  HENT: ear canals and TM's normal and nose and mouth without ulcers or lesions  NECK: no adenopathy  RESP: lungs clear to auscultation - no rales, rhonchi or wheezes  CV: no elevated JVD  LYMPHATICS: edema  ABDOMEN: non tender  MS: Planovalgus foot deformity left, posterior tibial tendon dysfunction, subfibular impingement left, healed incision of right ankle Achilles region, swelling  SKIN: Swelling right Achilles, see photo above      Data   Lab Results   Component Value Date     WBC 7.2 09/10/2020    WBC 6.3 09/03/2020    WBC 8.4 08/27/2020    WBC 9.3 08/20/2020    WBC 13.2 (H) 08/17/2020    HGB 12.3 09/10/2020    HGB 12.6 09/03/2020    HGB 13.2 08/27/2020    HGB 12.7 08/20/2020    HGB 13.3 08/17/2020    HCT 39.7 09/10/2020    HCT 39.6 09/03/2020    HCT 42.6 08/27/2020    HCT 39.8 08/20/2020    HCT 41.5 08/17/2020     09/10/2020     09/03/2020     08/27/2020     08/20/2020     08/17/2020     08/17/2020    POTASSIUM 4.0 08/17/2020    CHLORIDE 106 08/17/2020    CO2 25 08/17/2020    BUN 11 09/10/2020    BUN 12 09/03/2020    BUN 11 08/27/2020    BUN 9 08/20/2020    BUN 10 08/17/2020    CR 0.77 09/10/2020    CR 0.82 09/03/2020    CR 0.74 08/27/2020    CR 0.76 08/20/2020    CR 0.79 08/17/2020    GLC 98 08/17/2020    SED 48 (H) 09/10/2020    SED 41 (H) 09/03/2020    SED 47 (H) 08/27/2020    SED 58 (H) 08/20/2020    AST 13 09/10/2020    AST 11 09/03/2020    AST 12 08/27/2020    AST 14 08/20/2020     MICRO:  Reviewed    RADIOLOGY:    None new, MRI ordered    Total Time Spent 60 minutes with >50% of the time spent in counseling, education and care coordination.

## 2022-08-01 NOTE — PATIENT INSTRUCTIONS
Ranulfo Cook,   Thank you for taking the time to see us today. We discussed test results that we need to do today(blood tests) and start Augmentin (antibiotic).     MRI R ankle/achilles    Fluconazole for skin yeast infection    Follow up in 2-3 weeks. If there is worsening of swelling, drainage, then go to ER.     Please call with questions.    Zuly Barreto MD  [facility]  [phone]

## 2022-08-04 ENCOUNTER — TELEPHONE (OUTPATIENT)
Dept: INFECTIOUS DISEASES | Facility: CLINIC | Age: 87
End: 2022-08-04

## 2022-08-04 NOTE — TELEPHONE ENCOUNTER
Rach from [facility] called requesting confirmation on MRI with and without contrast for comparison. Reviewed with ANABEL Paz order was placed as with contrast initially. OK on change, gave VO order.

## 2022-08-06 LAB — BACTERIA BLD CULT: NO GROWTH

## 2022-08-08 ENCOUNTER — TELEPHONE (OUTPATIENT)
Dept: INFECTIOUS DISEASES | Facility: CLINIC | Age: 87
End: 2022-08-08

## 2022-08-08 DIAGNOSIS — R19.7 DIARRHEA: ICD-10-CM

## 2022-08-08 DIAGNOSIS — M65.10 ACHILLES TENDON INFECTION: Primary | ICD-10-CM

## 2022-08-08 NOTE — TELEPHONE ENCOUNTER
I called the pt and informed of the below. Pt states she did not take the abx today and took a pro-biotic and is feeling better. Pt will perform the c-diff if she continues to have diarrhea. Pt understands.

## 2022-08-08 NOTE — TELEPHONE ENCOUNTER
Patient called. Dr. Barreto prescribed Amoxikclav and it's making her feel sick. She had diarrhea the first few days and now she just doesn't feel good.   Please call her asap to further discuss.

## 2022-08-08 NOTE — TELEPHONE ENCOUNTER
Caller: Unspecified (Today, 10:04 AM)  Please order C difficile PCR for the patient.   Hold Augmetin   Awaiting MRI   Follow up with Primary if nausea/diarrhea/feeling unwell persists     Please call and update the patient.     Thank you     AR

## 2022-08-08 NOTE — TELEPHONE ENCOUNTER
I called the pt, she states that she had diarrhea for 3-4 days after starting augmentin and today she doesn't feel well with nausea. The pt is eating yogurt daily. I advised that she start a pro-biotic and I will review with the MD to see if she advises anything further and contact the pt back.

## 2022-08-28 ENCOUNTER — HOSPITAL ENCOUNTER (OUTPATIENT)
Dept: MRI IMAGING | Facility: HOSPITAL | Age: 87
Discharge: HOME | End: 2022-08-28
Attending: INTERNAL MEDICINE | Admitting: INTERNAL MEDICINE
Payer: COMMERCIAL

## 2022-08-28 DIAGNOSIS — T14.8XXA LOCAL INFECTION OF WOUND: ICD-10-CM

## 2022-08-28 DIAGNOSIS — L08.9 LOCAL INFECTION OF WOUND: ICD-10-CM

## 2022-08-28 DIAGNOSIS — M65.10 ACHILLES TENDON INFECTION: ICD-10-CM

## 2022-08-28 PROCEDURE — 73723 MRI JOINT LWR EXTR W/O&W/DYE: CPT | Mod: RT

## 2022-08-28 PROCEDURE — A9585 GADOBUTROL INJECTION: HCPCS | Performed by: INTERNAL MEDICINE

## 2022-08-28 PROCEDURE — 255N000002 HC RX 255 OP 636: Performed by: INTERNAL MEDICINE

## 2022-08-28 RX ORDER — GADOBUTROL 604.72 MG/ML
7 INJECTION INTRAVENOUS ONCE
Status: COMPLETED | OUTPATIENT
Start: 2022-08-28 | End: 2022-08-28

## 2022-08-28 RX ADMIN — GADOBUTROL 7 ML: 604.72 INJECTION INTRAVENOUS at 11:53

## 2022-08-29 ENCOUNTER — OFFICE VISIT (OUTPATIENT)
Dept: INFECTIOUS DISEASES | Facility: CLINIC | Age: 87
End: 2022-08-29
Payer: COMMERCIAL

## 2022-08-29 ENCOUNTER — LAB (OUTPATIENT)
Dept: LAB | Facility: CLINIC | Age: 87
End: 2022-08-29

## 2022-08-29 ENCOUNTER — TELEPHONE (OUTPATIENT)
Dept: INFECTIOUS DISEASES | Facility: CLINIC | Age: 87
End: 2022-08-29

## 2022-08-29 VITALS — DIASTOLIC BLOOD PRESSURE: 88 MMHG | SYSTOLIC BLOOD PRESSURE: 162 MMHG | TEMPERATURE: 98.7 F | HEART RATE: 86 BPM

## 2022-08-29 DIAGNOSIS — B49 FUNGAL INFECTION: ICD-10-CM

## 2022-08-29 DIAGNOSIS — M65.10 ACHILLES TENDON INFECTION: ICD-10-CM

## 2022-08-29 DIAGNOSIS — L08.9 LOCAL INFECTION OF WOUND: ICD-10-CM

## 2022-08-29 DIAGNOSIS — I51.9 HEART PROBLEM: ICD-10-CM

## 2022-08-29 DIAGNOSIS — T14.8XXA LOCAL INFECTION OF WOUND: ICD-10-CM

## 2022-08-29 DIAGNOSIS — R60.9 EDEMA, UNSPECIFIED TYPE: ICD-10-CM

## 2022-08-29 DIAGNOSIS — R60.9 SWELLING: Primary | ICD-10-CM

## 2022-08-29 LAB
ALBUMIN SERPL BCG-MCNC: 4 G/DL (ref 3.5–5.2)
ALP SERPL-CCNC: 84 U/L (ref 35–104)
ALT SERPL W P-5'-P-CCNC: 20 U/L (ref 10–35)
ANION GAP SERPL CALCULATED.3IONS-SCNC: 9 MMOL/L (ref 7–15)
AST SERPL W P-5'-P-CCNC: 23 U/L (ref 10–35)
BASOPHILS # BLD AUTO: 0 10E3/UL (ref 0–0.2)
BASOPHILS NFR BLD AUTO: 0 %
BILIRUB SERPL-MCNC: 0.6 MG/DL
BUN SERPL-MCNC: 9.9 MG/DL (ref 8–23)
CALCIUM SERPL-MCNC: 9.7 MG/DL (ref 8.8–10.2)
CHLORIDE SERPL-SCNC: 104 MMOL/L (ref 98–107)
CREAT SERPL-MCNC: 0.85 MG/DL (ref 0.51–0.95)
CRP SERPL-MCNC: 3.18 MG/L
DEPRECATED HCO3 PLAS-SCNC: 25 MMOL/L (ref 22–29)
EOSINOPHIL # BLD AUTO: 0 10E3/UL (ref 0–0.7)
EOSINOPHIL NFR BLD AUTO: 0 %
ERYTHROCYTE [DISTWIDTH] IN BLOOD BY AUTOMATED COUNT: 14.3 % (ref 10–15)
ERYTHROCYTE [SEDIMENTATION RATE] IN BLOOD BY WESTERGREN METHOD: 44 MM/HR (ref 0–20)
GFR SERPL CREATININE-BSD FRML MDRD: 66 ML/MIN/1.73M2
GLUCOSE SERPL-MCNC: 113 MG/DL (ref 70–99)
HCT VFR BLD AUTO: 38.8 % (ref 35–47)
HGB BLD-MCNC: 12.2 G/DL (ref 11.7–15.7)
IMM GRANULOCYTES # BLD: 0.1 10E3/UL
IMM GRANULOCYTES NFR BLD: 2 %
LYMPHOCYTES # BLD AUTO: 1.8 10E3/UL (ref 0.8–5.3)
LYMPHOCYTES NFR BLD AUTO: 20 %
MCH RBC QN AUTO: 26.2 PG (ref 26.5–33)
MCHC RBC AUTO-ENTMCNC: 31.4 G/DL (ref 31.5–36.5)
MCV RBC AUTO: 83 FL (ref 78–100)
MONOCYTES # BLD AUTO: 0.5 10E3/UL (ref 0–1.3)
MONOCYTES NFR BLD AUTO: 5 %
NEUTROPHILS # BLD AUTO: 6.5 10E3/UL (ref 1.6–8.3)
NEUTROPHILS NFR BLD AUTO: 73 %
PLATELET # BLD AUTO: 332 10E3/UL (ref 150–450)
POTASSIUM SERPL-SCNC: 4.4 MMOL/L (ref 3.4–5.3)
PROT SERPL-MCNC: 7.7 G/DL (ref 6.4–8.3)
RBC # BLD AUTO: 4.66 10E6/UL (ref 3.8–5.2)
SODIUM SERPL-SCNC: 138 MMOL/L (ref 136–145)
WBC # BLD AUTO: 9 10E3/UL (ref 4–11)

## 2022-08-29 PROCEDURE — 80053 COMPREHEN METABOLIC PANEL: CPT

## 2022-08-29 PROCEDURE — 85652 RBC SED RATE AUTOMATED: CPT

## 2022-08-29 PROCEDURE — 85025 COMPLETE CBC W/AUTO DIFF WBC: CPT

## 2022-08-29 PROCEDURE — 36415 COLL VENOUS BLD VENIPUNCTURE: CPT

## 2022-08-29 PROCEDURE — 99215 OFFICE O/P EST HI 40 MIN: CPT | Performed by: INTERNAL MEDICINE

## 2022-08-29 PROCEDURE — 86140 C-REACTIVE PROTEIN: CPT

## 2022-08-29 RX ORDER — FLUCONAZOLE 200 MG/1
200 TABLET ORAL DAILY
Qty: 7 TABLET | Refills: 1 | Status: ON HOLD | OUTPATIENT
Start: 2022-08-29 | End: 2022-09-06

## 2022-08-29 RX ORDER — CEPHALEXIN 500 MG/1
500 CAPSULE ORAL 3 TIMES DAILY
Qty: 42 CAPSULE | Refills: 1 | Status: ON HOLD | OUTPATIENT
Start: 2022-08-29 | End: 2022-09-15

## 2022-08-29 RX ORDER — CEPHALEXIN 500 MG/1
CAPSULE ORAL
COMMUNITY
Start: 2022-08-18 | End: 2022-09-02

## 2022-08-29 NOTE — TELEPHONE ENCOUNTER
LMTCB if questions    Dr Rodríguez [facility]  [phone]  fax: [phone]    Per Dr Barreto, faxed MRI results and message regarding infection not healing and patient has many allergies. Did not tolerate oral augmentin. Needs f/u with Dr Rodríguez

## 2022-08-29 NOTE — PROGRESS NOTES
Infectious Disease Consultation     Date of Consult (When I saw the patient): 08/01/22    Assessment & Plan   Joyce Brian is a 88 year old female who presents for consultation for Achilles tendon    Impression:    1. Patient has history of Achilles tendon rupture, status post surgery  2. History of Achilles tendon infection, was treated with IV ceftriaxone in 2020 for E. coli infection  3. Fluid collection with sinus tract to the skin- photo of Achilles region (R) on 8/29/2022         4. Polymyalgia rheumatica  5. Mild intermittent asthma  6. Allergy side effects: Levofloxacin tendon rupture risk with quinolones, ciprofloxacin allergy Doxy allergy and sulfa allergy. Diarrhea with Augmentin  7. Had mild trauma to the Achilles tendon about 1 year ago, patient did not seek medical care, intermittent low-grade drainage and swelling, seen by Dr. Rodríguez in [facility] orthopedic in May 2022, and due to chronic infection surgical intervention, I&D was recommended.  Patient did not wish to proceed with surgery at this time due to her advanced age and prolonged healing and other concerns.  Referred to infectious disease for antibiotics  8. No fevers, no systemic symptoms however we do not have any culture results to guide antibiotic therapy at this time  9. Tinea corporis, pruritic on abdominal wall- improved with Fluconazole, though note resolved  10. MRI- fluid collection with sinus tract to skin         Taken with patient's permission previously : Right Achilles tendon region with swelling, no drainage        Recommendations    Diarrhea with Augmentin- stopped. Will start empiric Keflex 3 times a day with food.     MRI R ankle/achilles- fluid and sent results of MRI to Dr. Cuevas as patient will need I&D    Fluconazole for skin yeast infection    Follow up in 1-2 weeks. If there is worsening of swelling, drainage, then go to ER.     Please call with questions.    Zuly Barreto MD  [facility]  [phone]      Total Time Spent 40 minutes with >50% of the time spent in counseling, education and care coordination.           History of Present Illness   Joyce Brian is a 88 year old female who presents with achilles wound follow up. Did not take Augmentin due to diarrhea. Swelling in legs.       Previous note:    Achilles infection in 2020, treated with IV antibiotics   Completed, the wound improved.   Wore some new shoes, opened up again 1.5 year ago.   Drains every days. Didn't see anyone until May 2022. Ruptures Achilles several years ago.    No fever, no chills.   Does not want another surgery    Fatigue    Past Medical History   I have reviewed this patient's medical history and updated it with pertinent information if needed.   Past Medical History:   Diagnosis Date     Acid reflux      Anemia      Arthritis      BPPV (benign paroxysmal positional vertigo)      History of anesthesia complications      Hypertension      Lung nodules      Mild intermittent asthma      Polymyalgia rheumatica (H)      PONV (postoperative nausea and vomiting)      Restless leg syndrome      Sjogren's syndrome (H)      Sleep apnea      TIA (transient ischemic attack) 2012       Past Surgical History   I have reviewed this patient's surgical history and updated it with pertinent information if needed.  Past Surgical History:   Procedure Laterality Date     ANKLE SURGERY       BLADDER SURGERY       HYSTERECTOMY       INCISION AND DRAINAGE OF WOUND Right 8/13/2020    Procedure: INCISION AND DRAINAGE ACHILLES TENDON, RIGHT ANKLE;  Surgeon: Chan Rodríguez DPM;  Location: [facility];  Service: Podiatry     JOINT REPLACEMENT Bilateral     hips and knees     PIC  8/16/2020          RELEASE CARPAL TUNNEL       TONSILLECTOMY         Prior to Admission Medications   Cannot display prior to admission medications because the patient has not been admitted in this contact.     Allergies   Allergies   Allergen  Reactions     Augmentin      Amlodipine Diarrhea     Ciprofloxacin Other (See Comments)     Tendon injury     Doxycycline Nausea and Vomiting     Duloxetine Nausea     Duloxetine Hcl [Duloxetine] Other (See Comments)     Nausea, even with low dose     Hydroxychloroquine Nausea and Vomiting     Levofloxacin Nausea and Vomiting     Nitrofurantoin Nausea and Vomiting     Phenothiazines Other (See Comments)     Worsening of restless legs     Strawberry Hives     Sulfa (Sulfonamide Antibiotics) [Sulfa Drugs] Unknown     Walnuts [Black [place] Pollen Allergy Skin Test] Hives     Morphine Rash       Immunization History   Immunization History   Administered Date(s) Administered     COVID-19,PF,Moderna 01/18/2021, 02/15/2021, 11/05/2021, 06/09/2022       Social History   I have reviewed this patient's social history and updated it with pertinent information if needed. Joyce Brian  reports that she has never smoked. She has never used smokeless tobacco. She reports previous alcohol use. She reports that she does not use drugs.    Family History   I have reviewed this patient's family history and updated it with pertinent information if needed.   Family History   Problem Relation Age of Onset     Breast Cancer Mother        Review of Systems   The 10 point Review of Systems is negative other than noted in the HPI or here.     Physical Exam   Temp: 98.7  F (37.1  C)   BP: (!) 152/80 Pulse: 86            Vital Signs with Ranges  Temp:  [98.7  F (37.1  C)] 98.7  F (37.1  C)  Pulse:  [86] 86  BP: (152)/(80) 152/80  0 lbs 0 oz  There is no height or weight on file to calculate BMI.    GENERAL APPEARANCE:  awake  EYES: Eyes grossly normal to inspection  NECK: no adenopathy  RESP: normal breathing patterm  CV: no elevated JVD  LYMPHATICS: edema  ABDOMEN: non tender  MS: Planovalgus foot deformity left, posterior tibial tendon dysfunction, subfibular impingement left, small open wound - see photo above  SKIN: Swelling right  Achilles, see photo from 8/29/2022           Data   Lab Results   Component Value Date    WBC 8.6 08/01/2022    WBC 7.2 09/10/2020    WBC 6.3 09/03/2020    WBC 8.4 08/27/2020    WBC 9.3 08/20/2020    HGB 12.5 08/01/2022    HGB 12.3 09/10/2020    HGB 12.6 09/03/2020    HGB 13.2 08/27/2020    HGB 12.7 08/20/2020    HCT 40.0 08/01/2022    HCT 39.7 09/10/2020    HCT 39.6 09/03/2020    HCT 42.6 08/27/2020    HCT 39.8 08/20/2020     08/01/2022     09/10/2020     09/03/2020     08/27/2020     08/20/2020     08/01/2022     08/17/2020    POTASSIUM 4.7 08/01/2022    POTASSIUM 4.0 08/17/2020    CHLORIDE 104 08/01/2022    CHLORIDE 106 08/17/2020    CO2 25 08/01/2022    CO2 25 08/17/2020    BUN 11.2 08/01/2022    BUN 11 09/10/2020    BUN 12 09/03/2020    BUN 11 08/27/2020    BUN 9 08/20/2020    CR 0.83 08/01/2022    CR 0.77 09/10/2020    CR 0.82 09/03/2020    CR 0.74 08/27/2020    CR 0.76 08/20/2020     (H) 08/01/2022    GLC 98 08/17/2020    SED 43 (H) 08/01/2022    SED 48 (H) 09/10/2020    SED 41 (H) 09/03/2020    SED 47 (H) 08/27/2020    SED 58 (H) 08/20/2020    AST 20 08/01/2022    AST 13 09/10/2020    AST 11 09/03/2020    AST 12 08/27/2020    AST 14 08/20/2020    ALT 18 08/01/2022    ALKPHOS 87 08/01/2022    BILITOTAL 0.6 08/01/2022     MICRO:  Reviewed    RADIOLOGY:    MR Ankle Right w/o & w Contrast    Result Date: 8/28/2022  EXAM: MR ANKLE RIGHT W/O and W CONTRAST LOCATION: [facility] DATE/TIME: 8/28/2022 12:11 PM INDICATION:  Local infection of wound, Achilles tendon infection COMPARISON: None. TECHNIQUE: Routine. Additional postgadolinium T1 sequences were obtained. IV CONTRAST: 7ml Gadavist FINDINGS: TENDONS: -Peroneal: Peroneus longus and brevis tendons are intact. No tendinopathy or tenosynovitis. No subluxation. -Medial: Posterior tibialis tendon is intact. No tendinopathy or tenosynovitis. Flexor digitorum longus and flexor hallucis  longus tendons are normal. No tenosynovitis. -Anterior: Anterior tibialis, extensor hallucis longus, and extensor digitorum longus tendons are normal. No tenosynovitis. -Achilles: Severe acute on chronic Achilles tendon tendinopathy with partial-thickness intrasubstance tearing. No full-thickness tearing or retraction. In addition, there is signal abnormality within the tendon which extends in a potential sinus tract to  near the skin surface worrisome for superimposed infection. This is seen on series 7 and 8, image 24 and series 5 image 13. LIGAMENTS: -Anterior talofibular ligament: Intact. -Calcaneofibular ligament: Intact. -Posterior talofibular ligament: Intact. -Syndesmotic inferior tibiofibular ligaments: Intact. -Deltoid ligament complex: Intact. -Spring ligament complex: Intact. JOINTS AND BONES: -No evidence for fracture. No evidence for solid bone lesion. No evidence for osteomyelitis. No effusion to suggest septic arthropathy. SOFT TISSUES: -Plantar fascia: Intact. No acute fasciitis or tear. -Sinus tarsi and tarsal tunnel: Normal. -Muscles: Normal. -Edema or cellulitis along the dorsal aspect of the foot. Edema surrounding the second draining sinus tract from the Achilles tendon the skin surface. No evidence for organized abscess.     IMPRESSION: 1.  Acute on chronic Achilles tendon tendinopathy with intrasubstance partial thickness tearing as well as peripherally enhancing fluid which extends from the tendinous portion of the Achilles tendon through a sinus tract to near the skin surface worrisome for superimposed infection. No full-thickness tearing is identified. 2.  No evidence for fracture and there is no evidence for osteomyelitis or significant effusion to suggest septic arthropathy. 3.  Edema or cellulitis about the lower leg and along the dorsal aspect of the foot. 4.  No evidence for fracture. 5.  No additional tendinous or ligamentous pathology.

## 2022-08-29 NOTE — PATIENT INSTRUCTIONS
Dr Marcos Talbert [facility]  [phone]  fax: [phone]    Recommend follow up with Orthopedic re: tendon infection  Cephalexin (Keflex)   Follow up with primary care re: swelling (edema)  Labs today: CBC with diff, CMP, ESR, CRP on 8/29/2022      [employer] for gut immunity.  [email or web address]    Follow up depending on when orthopedic can see you. Will tentatively schedule in 2-3 weeks    Zuly Barreto MD  [facility]  [phone]    Right Achilles wound

## 2022-09-01 NOTE — OR NURSING
Pt stated she was told she is going to a TCU post op. I sent an e-mail to Estrella and Kaitlin alerting them of this.    I also sent a teams message to Ashanti in IC asking her to inquire on why the patient is a r/o c-diff and are r/o c-diff precautions necessary?. The patient stated she had diarrhea while taking augmentin, but once stopped, no further issues. ID doc prescribed empiric keflex TID. Patient states nobody ever collected a stool sample.     9/1 1330 Per Ashanti in IC, no precautions needed. She will remove isolation.

## 2022-09-05 ENCOUNTER — ANESTHESIA EVENT (OUTPATIENT)
Dept: SURGERY | Facility: CLINIC | Age: 87
DRG: 501 | End: 2022-09-05
Payer: COMMERCIAL

## 2022-09-06 ENCOUNTER — HOSPITAL ENCOUNTER (INPATIENT)
Facility: CLINIC | Age: 87
LOS: 9 days | Discharge: NURSING, REHAB OR LONG-TERM CARE | DRG: 501 | End: 2022-09-15
Attending: PODIATRIST | Admitting: PODIATRIST
Payer: COMMERCIAL

## 2022-09-06 ENCOUNTER — ANESTHESIA (OUTPATIENT)
Dept: SURGERY | Facility: CLINIC | Age: 87
DRG: 501 | End: 2022-09-06
Payer: COMMERCIAL

## 2022-09-06 DIAGNOSIS — M65.10 ACHILLES TENDON INFECTION: Primary | ICD-10-CM

## 2022-09-06 PROBLEM — Z98.890 S/P FOOT SURGERY: Status: ACTIVE | Noted: 2022-09-06

## 2022-09-06 PROBLEM — G43.909 MIGRAINE HEADACHE: Status: ACTIVE | Noted: 2022-09-06

## 2022-09-06 LAB
CREAT SERPL-MCNC: 0.89 MG/DL (ref 0.6–1.1)
GFR SERPL CREATININE-BSD FRML MDRD: 62 ML/MIN/1.73M2

## 2022-09-06 PROCEDURE — 36415 COLL VENOUS BLD VENIPUNCTURE: CPT | Performed by: PODIATRIST

## 2022-09-06 PROCEDURE — 99214 OFFICE O/P EST MOD 30 MIN: CPT | Performed by: FAMILY MEDICINE

## 2022-09-06 PROCEDURE — 250N000013 HC RX MED GY IP 250 OP 250 PS 637: Performed by: FAMILY MEDICINE

## 2022-09-06 PROCEDURE — 258N000001 HC RX 258: Performed by: PODIATRIST

## 2022-09-06 PROCEDURE — 250N000009 HC RX 250: Performed by: PODIATRIST

## 2022-09-06 PROCEDURE — 258N000003 HC RX IP 258 OP 636: Performed by: NURSE ANESTHETIST, CERTIFIED REGISTERED

## 2022-09-06 PROCEDURE — 360N000075 HC SURGERY LEVEL 2, PER MIN: Performed by: PODIATRIST

## 2022-09-06 PROCEDURE — 250N000011 HC RX IP 250 OP 636: Performed by: NURSE ANESTHETIST, CERTIFIED REGISTERED

## 2022-09-06 PROCEDURE — 87102 FUNGUS ISOLATION CULTURE: CPT | Performed by: INTERNAL MEDICINE

## 2022-09-06 PROCEDURE — 258N000003 HC RX IP 258 OP 636: Performed by: PODIATRIST

## 2022-09-06 PROCEDURE — 999N000141 HC STATISTIC PRE-PROCEDURE NURSING ASSESSMENT: Performed by: PODIATRIST

## 2022-09-06 PROCEDURE — 710N000010 HC RECOVERY PHASE 1, LEVEL 2, PER MIN: Performed by: PODIATRIST

## 2022-09-06 PROCEDURE — 88304 TISSUE EXAM BY PATHOLOGIST: CPT | Mod: TC | Performed by: PODIATRIST

## 2022-09-06 PROCEDURE — 250N000011 HC RX IP 250 OP 636: Performed by: PODIATRIST

## 2022-09-06 PROCEDURE — 87075 CULTR BACTERIA EXCEPT BLOOD: CPT | Performed by: PODIATRIST

## 2022-09-06 PROCEDURE — 250N000012 HC RX MED GY IP 250 OP 636 PS 637: Performed by: FAMILY MEDICINE

## 2022-09-06 PROCEDURE — 87077 CULTURE AEROBIC IDENTIFY: CPT | Performed by: PODIATRIST

## 2022-09-06 PROCEDURE — 272N000001 HC OR GENERAL SUPPLY STERILE: Performed by: PODIATRIST

## 2022-09-06 PROCEDURE — 258N000003 HC RX IP 258 OP 636: Performed by: ANESTHESIOLOGY

## 2022-09-06 PROCEDURE — 250N000013 HC RX MED GY IP 250 OP 250 PS 637: Performed by: PODIATRIST

## 2022-09-06 PROCEDURE — 370N000017 HC ANESTHESIA TECHNICAL FEE, PER MIN: Performed by: PODIATRIST

## 2022-09-06 PROCEDURE — 250N000013 HC RX MED GY IP 250 OP 250 PS 637: Performed by: ANESTHESIOLOGY

## 2022-09-06 PROCEDURE — 250N000009 HC RX 250: Performed by: NURSE ANESTHETIST, CERTIFIED REGISTERED

## 2022-09-06 PROCEDURE — 0LBN0ZZ EXCISION OF RIGHT LOWER LEG TENDON, OPEN APPROACH: ICD-10-PCS | Performed by: PODIATRIST

## 2022-09-06 PROCEDURE — 82565 ASSAY OF CREATININE: CPT | Performed by: PODIATRIST

## 2022-09-06 RX ORDER — POLYETHYLENE GLYCOL 3350 17 G/17G
17 POWDER, FOR SOLUTION ORAL DAILY
Status: DISCONTINUED | OUTPATIENT
Start: 2022-09-07 | End: 2022-09-09

## 2022-09-06 RX ORDER — NALOXONE HYDROCHLORIDE 0.4 MG/ML
0.2 INJECTION, SOLUTION INTRAMUSCULAR; INTRAVENOUS; SUBCUTANEOUS
Status: DISCONTINUED | OUTPATIENT
Start: 2022-09-06 | End: 2022-09-15 | Stop reason: HOSPADM

## 2022-09-06 RX ORDER — HYDROXYCHLOROQUINE SULFATE 200 MG/1
200 TABLET, FILM COATED ORAL DAILY
Status: DISCONTINUED | OUTPATIENT
Start: 2022-09-06 | End: 2022-09-15 | Stop reason: HOSPADM

## 2022-09-06 RX ORDER — PRAMIPEXOLE DIHYDROCHLORIDE 0.25 MG/1
0.5 TABLET ORAL AT BEDTIME
Status: DISCONTINUED | OUTPATIENT
Start: 2022-09-06 | End: 2022-09-15 | Stop reason: HOSPADM

## 2022-09-06 RX ORDER — PREDNISONE 2.5 MG/1
2.5 TABLET ORAL DAILY
COMMUNITY

## 2022-09-06 RX ORDER — OXYCODONE HYDROCHLORIDE 5 MG/1
5 TABLET ORAL EVERY 4 HOURS PRN
Status: DISCONTINUED | OUTPATIENT
Start: 2022-09-06 | End: 2022-09-06 | Stop reason: HOSPADM

## 2022-09-06 RX ORDER — GABAPENTIN 100 MG/1
200 CAPSULE ORAL AT BEDTIME
Status: DISCONTINUED | OUTPATIENT
Start: 2022-09-06 | End: 2022-09-15 | Stop reason: HOSPADM

## 2022-09-06 RX ORDER — LIDOCAINE 40 MG/G
CREAM TOPICAL
Status: DISCONTINUED | OUTPATIENT
Start: 2022-09-06 | End: 2022-09-15 | Stop reason: HOSPADM

## 2022-09-06 RX ORDER — SODIUM CHLORIDE, SODIUM LACTATE, POTASSIUM CHLORIDE, CALCIUM CHLORIDE 600; 310; 30; 20 MG/100ML; MG/100ML; MG/100ML; MG/100ML
INJECTION, SOLUTION INTRAVENOUS CONTINUOUS
Status: DISCONTINUED | OUTPATIENT
Start: 2022-09-06 | End: 2022-09-07 | Stop reason: CLARIF

## 2022-09-06 RX ORDER — ONDANSETRON 2 MG/ML
4 INJECTION INTRAMUSCULAR; INTRAVENOUS EVERY 6 HOURS PRN
Status: DISCONTINUED | OUTPATIENT
Start: 2022-09-06 | End: 2022-09-15 | Stop reason: HOSPADM

## 2022-09-06 RX ORDER — LOSARTAN POTASSIUM 50 MG/1
50 TABLET ORAL DAILY
Status: DISCONTINUED | OUTPATIENT
Start: 2022-09-07 | End: 2022-09-15 | Stop reason: HOSPADM

## 2022-09-06 RX ORDER — PREDNISONE 2.5 MG/1
2.5 TABLET ORAL DAILY
Status: DISCONTINUED | OUTPATIENT
Start: 2022-09-06 | End: 2022-09-15 | Stop reason: HOSPADM

## 2022-09-06 RX ORDER — FENTANYL CITRATE 50 UG/ML
25 INJECTION, SOLUTION INTRAMUSCULAR; INTRAVENOUS
Status: DISCONTINUED | OUTPATIENT
Start: 2022-09-06 | End: 2022-09-06 | Stop reason: HOSPADM

## 2022-09-06 RX ORDER — CLINDAMYCIN PHOSPHATE 900 MG/50ML
900 INJECTION, SOLUTION INTRAVENOUS SEE ADMIN INSTRUCTIONS
Status: DISCONTINUED | OUTPATIENT
Start: 2022-09-06 | End: 2022-09-06 | Stop reason: HOSPADM

## 2022-09-06 RX ORDER — LIDOCAINE HYDROCHLORIDE 10 MG/ML
INJECTION, SOLUTION INFILTRATION; PERINEURAL PRN
Status: DISCONTINUED | OUTPATIENT
Start: 2022-09-06 | End: 2022-09-06

## 2022-09-06 RX ORDER — HYDROMORPHONE HCL IN WATER/PF 6 MG/30 ML
0.2 PATIENT CONTROLLED ANALGESIA SYRINGE INTRAVENOUS
Status: DISCONTINUED | OUTPATIENT
Start: 2022-09-06 | End: 2022-09-11

## 2022-09-06 RX ORDER — NALOXONE HYDROCHLORIDE 0.4 MG/ML
0.4 INJECTION, SOLUTION INTRAMUSCULAR; INTRAVENOUS; SUBCUTANEOUS
Status: DISCONTINUED | OUTPATIENT
Start: 2022-09-06 | End: 2022-09-15 | Stop reason: HOSPADM

## 2022-09-06 RX ORDER — ACETAMINOPHEN 325 MG/1
650 TABLET ORAL EVERY 4 HOURS PRN
Status: DISCONTINUED | OUTPATIENT
Start: 2022-09-09 | End: 2022-09-15 | Stop reason: HOSPADM

## 2022-09-06 RX ORDER — CLINDAMYCIN PHOSPHATE 900 MG/50ML
900 INJECTION, SOLUTION INTRAVENOUS
Status: COMPLETED | OUTPATIENT
Start: 2022-09-06 | End: 2022-09-06

## 2022-09-06 RX ORDER — ACETAMINOPHEN 325 MG/1
975 TABLET ORAL EVERY 8 HOURS
Status: COMPLETED | OUTPATIENT
Start: 2022-09-06 | End: 2022-09-09

## 2022-09-06 RX ORDER — AMOXICILLIN 250 MG
1 CAPSULE ORAL 2 TIMES DAILY
Status: DISCONTINUED | OUTPATIENT
Start: 2022-09-06 | End: 2022-09-09

## 2022-09-06 RX ORDER — BUPIVACAINE HYDROCHLORIDE 5 MG/ML
INJECTION, SOLUTION PERINEURAL PRN
Status: DISCONTINUED | OUTPATIENT
Start: 2022-09-06 | End: 2022-09-06 | Stop reason: HOSPADM

## 2022-09-06 RX ORDER — FENTANYL CITRATE 50 UG/ML
50 INJECTION, SOLUTION INTRAMUSCULAR; INTRAVENOUS
Status: DISCONTINUED | OUTPATIENT
Start: 2022-09-06 | End: 2022-09-06 | Stop reason: HOSPADM

## 2022-09-06 RX ORDER — OXYCODONE HYDROCHLORIDE 5 MG/1
5 TABLET ORAL EVERY 4 HOURS PRN
Status: DISCONTINUED | OUTPATIENT
Start: 2022-09-06 | End: 2022-09-11

## 2022-09-06 RX ORDER — DEXAMETHASONE SODIUM PHOSPHATE 10 MG/ML
INJECTION, SOLUTION INTRAMUSCULAR; INTRAVENOUS PRN
Status: DISCONTINUED | OUTPATIENT
Start: 2022-09-06 | End: 2022-09-06

## 2022-09-06 RX ORDER — MEPERIDINE HYDROCHLORIDE 25 MG/ML
12.5 INJECTION INTRAMUSCULAR; INTRAVENOUS; SUBCUTANEOUS
Status: DISCONTINUED | OUTPATIENT
Start: 2022-09-06 | End: 2022-09-06 | Stop reason: HOSPADM

## 2022-09-06 RX ORDER — SODIUM CHLORIDE, SODIUM LACTATE, POTASSIUM CHLORIDE, CALCIUM CHLORIDE 600; 310; 30; 20 MG/100ML; MG/100ML; MG/100ML; MG/100ML
INJECTION, SOLUTION INTRAVENOUS CONTINUOUS
Status: DISCONTINUED | OUTPATIENT
Start: 2022-09-06 | End: 2022-09-06 | Stop reason: HOSPADM

## 2022-09-06 RX ORDER — HYDROMORPHONE HCL IN WATER/PF 6 MG/30 ML
0.2 PATIENT CONTROLLED ANALGESIA SYRINGE INTRAVENOUS EVERY 5 MIN PRN
Status: DISCONTINUED | OUTPATIENT
Start: 2022-09-06 | End: 2022-09-06 | Stop reason: HOSPADM

## 2022-09-06 RX ORDER — PROPOFOL 10 MG/ML
INJECTION, EMULSION INTRAVENOUS CONTINUOUS PRN
Status: DISCONTINUED | OUTPATIENT
Start: 2022-09-06 | End: 2022-09-06

## 2022-09-06 RX ORDER — PROPOFOL 10 MG/ML
INJECTION, EMULSION INTRAVENOUS PRN
Status: DISCONTINUED | OUTPATIENT
Start: 2022-09-06 | End: 2022-09-06

## 2022-09-06 RX ORDER — ONDANSETRON 4 MG/1
4 TABLET, ORALLY DISINTEGRATING ORAL EVERY 30 MIN PRN
Status: DISCONTINUED | OUTPATIENT
Start: 2022-09-06 | End: 2022-09-06 | Stop reason: HOSPADM

## 2022-09-06 RX ORDER — FENTANYL CITRATE 50 UG/ML
INJECTION, SOLUTION INTRAMUSCULAR; INTRAVENOUS PRN
Status: DISCONTINUED | OUTPATIENT
Start: 2022-09-06 | End: 2022-09-06

## 2022-09-06 RX ORDER — ACETAMINOPHEN 325 MG/1
975 TABLET ORAL ONCE
Status: COMPLETED | OUTPATIENT
Start: 2022-09-06 | End: 2022-09-06

## 2022-09-06 RX ORDER — ONDANSETRON 2 MG/ML
4 INJECTION INTRAMUSCULAR; INTRAVENOUS EVERY 30 MIN PRN
Status: DISCONTINUED | OUTPATIENT
Start: 2022-09-06 | End: 2022-09-06 | Stop reason: HOSPADM

## 2022-09-06 RX ORDER — CEFAZOLIN SODIUM 2 G/100ML
2 INJECTION, SOLUTION INTRAVENOUS EVERY 8 HOURS
Status: COMPLETED | OUTPATIENT
Start: 2022-09-06 | End: 2022-09-07

## 2022-09-06 RX ORDER — ENOXAPARIN SODIUM 100 MG/ML
40 INJECTION SUBCUTANEOUS EVERY 24 HOURS
Status: DISCONTINUED | OUTPATIENT
Start: 2022-09-07 | End: 2022-09-15 | Stop reason: HOSPADM

## 2022-09-06 RX ORDER — ONDANSETRON 4 MG/1
4 TABLET, ORALLY DISINTEGRATING ORAL EVERY 6 HOURS PRN
Status: DISCONTINUED | OUTPATIENT
Start: 2022-09-06 | End: 2022-09-15 | Stop reason: HOSPADM

## 2022-09-06 RX ORDER — BISACODYL 10 MG
10 SUPPOSITORY, RECTAL RECTAL DAILY PRN
Status: DISCONTINUED | OUTPATIENT
Start: 2022-09-06 | End: 2022-09-15 | Stop reason: HOSPADM

## 2022-09-06 RX ORDER — FENTANYL CITRATE 50 UG/ML
25 INJECTION, SOLUTION INTRAMUSCULAR; INTRAVENOUS EVERY 5 MIN PRN
Status: DISCONTINUED | OUTPATIENT
Start: 2022-09-06 | End: 2022-09-06 | Stop reason: HOSPADM

## 2022-09-06 RX ORDER — LIDOCAINE 40 MG/G
CREAM TOPICAL
Status: DISCONTINUED | OUTPATIENT
Start: 2022-09-06 | End: 2022-09-06 | Stop reason: HOSPADM

## 2022-09-06 RX ORDER — PANTOPRAZOLE SODIUM 20 MG/1
40 TABLET, DELAYED RELEASE ORAL
Status: DISCONTINUED | OUTPATIENT
Start: 2022-09-07 | End: 2022-09-15 | Stop reason: HOSPADM

## 2022-09-06 RX ADMIN — ONDANSETRON 4 MG: 4 TABLET, ORALLY DISINTEGRATING ORAL at 19:28

## 2022-09-06 RX ADMIN — LIDOCAINE HYDROCHLORIDE 20 MG: 10 INJECTION, SOLUTION INFILTRATION; PERINEURAL at 16:12

## 2022-09-06 RX ADMIN — PHENYLEPHRINE HYDROCHLORIDE 50 MCG: 10 INJECTION INTRAVENOUS at 16:48

## 2022-09-06 RX ADMIN — PHENYLEPHRINE HYDROCHLORIDE 50 MCG: 10 INJECTION INTRAVENOUS at 16:45

## 2022-09-06 RX ADMIN — PROPOFOL 130 MG: 10 INJECTION, EMULSION INTRAVENOUS at 16:12

## 2022-09-06 RX ADMIN — SODIUM CHLORIDE, POTASSIUM CHLORIDE, SODIUM LACTATE AND CALCIUM CHLORIDE: 600; 310; 30; 20 INJECTION, SOLUTION INTRAVENOUS at 22:24

## 2022-09-06 RX ADMIN — SODIUM CHLORIDE, POTASSIUM CHLORIDE, SODIUM LACTATE AND CALCIUM CHLORIDE: 600; 310; 30; 20 INJECTION, SOLUTION INTRAVENOUS at 14:44

## 2022-09-06 RX ADMIN — ACETAMINOPHEN 975 MG: 325 TABLET ORAL at 14:52

## 2022-09-06 RX ADMIN — DEXAMETHASONE SODIUM PHOSPHATE 10 MG: 10 INJECTION, SOLUTION INTRAMUSCULAR; INTRAVENOUS at 16:12

## 2022-09-06 RX ADMIN — PHENYLEPHRINE HYDROCHLORIDE 100 MCG: 10 INJECTION INTRAVENOUS at 16:18

## 2022-09-06 RX ADMIN — PHENYLEPHRINE HYDROCHLORIDE 50 MCG: 10 INJECTION INTRAVENOUS at 16:51

## 2022-09-06 RX ADMIN — FENTANYL CITRATE 100 MCG: 50 INJECTION, SOLUTION INTRAMUSCULAR; INTRAVENOUS at 16:12

## 2022-09-06 RX ADMIN — PRAMIPEXOLE DIHYDROCHLORIDE 0.5 MG: 0.25 TABLET ORAL at 22:14

## 2022-09-06 RX ADMIN — PROPOFOL 120 MCG/KG/MIN: 10 INJECTION, EMULSION INTRAVENOUS at 16:12

## 2022-09-06 RX ADMIN — CEFAZOLIN SODIUM 2 G: 2 INJECTION, SOLUTION INTRAVENOUS at 22:14

## 2022-09-06 RX ADMIN — CLINDAMYCIN PHOSPHATE 900 MG: 900 INJECTION, SOLUTION INTRAVENOUS at 14:51

## 2022-09-06 RX ADMIN — PHENYLEPHRINE HYDROCHLORIDE 100 MCG: 10 INJECTION INTRAVENOUS at 16:21

## 2022-09-06 RX ADMIN — PREDNISONE 2.5 MG: 2.5 TABLET ORAL at 22:15

## 2022-09-06 RX ADMIN — ACETAMINOPHEN 975 MG: 325 TABLET, COATED ORAL at 22:13

## 2022-09-06 RX ADMIN — GABAPENTIN 200 MG: 100 CAPSULE ORAL at 22:13

## 2022-09-06 RX ADMIN — HYDROXYCHLOROQUINE SULFATE 200 MG: 200 TABLET, FILM COATED ORAL at 22:13

## 2022-09-06 RX ADMIN — OXYCODONE HYDROCHLORIDE 2.5 MG: 5 TABLET ORAL at 19:28

## 2022-09-06 RX ADMIN — SENNOSIDES AND DOCUSATE SODIUM 1 TABLET: 50; 8.6 TABLET ORAL at 22:14

## 2022-09-06 ASSESSMENT — ACTIVITIES OF DAILY LIVING (ADL)
ADLS_ACUITY_SCORE: 44
ADLS_ACUITY_SCORE: 34
ADLS_ACUITY_SCORE: 44
ADLS_ACUITY_SCORE: 44
ADLS_ACUITY_SCORE: 34

## 2022-09-06 ASSESSMENT — ENCOUNTER SYMPTOMS: DYSRHYTHMIAS: 1

## 2022-09-06 NOTE — BRIEF OP NOTE
[facility]    Brief Operative Note    Pre-operative diagnosis: Musculoskeletal disorder [M79.9]  Post-operative diagnosis achilles tendon infection right    Procedure: Procedure(s):  RIGHT ACHILLES TENDON INCISION, DRAINAGE AND DEBRIDEMENT  Surgeon: Surgeon(s) and Role:     * Chan Rodríguez DPM - Primary  Anesthesia: General   Estimated Blood Loss: 1 mL from 9/6/2022  4:07 PM to 9/6/2022  5:06 PM      Drains: None  Specimens:   ID Type Source Tests Collected by Time Destination   1 : Section Right Achilles Tendon Tissue Leg, Right SURGICAL PATHOLOGY EXAM Chan Rodríguez DPM 9/6/2022  4:36 PM    A : Right achilles tendon infection Swab Leg, Right ANAEROBIC BACTERIAL CULTURE ROUTINE, AEROBIC BACTERIAL CULTURE ROUTINE Chan Rodríguez DPM 9/6/2022  4:35 PM      Findings:   None.  Complications: None.  Implants: * No implants in log *

## 2022-09-06 NOTE — OP NOTE
Procedure Date: 09/06/2022    SURGEON:  Chan Rodríguez MD, DPM    PREOPERATIVE DIAGNOSIS:  Achilles tendon infection, chronic, right.    POSTOPERATIVE DIAGNOSIS:  Achilles tendon infection, chronic, right.    PROCEDURE:  Achilles tendon debridement/resection, right.    ANESTHESIA:  General.    PREOPERATIVE ANTIBIOTIC:  Clindamycin 600 mg IV x1.    ESTIMATED BLOOD LOSS:  Minimal.    INDICATIONS FOR PROCEDURE:  The patient is an 88-year-old female who has a history of Achilles tendon insertion on this right side.  She has had an infection, which had been chronic and recurrent for a number of years.  She has had multiple repeat I and Ds.  She is to have an open wound with drainage and extrusion of the suture.  She is here today for surgical repair.  Discussed the procedure, perioperative course and procedures complications with her.  All questions answered, consent was obtained.    DESCRIPTION OF PROCEDURE:  The patient was brought to the operating room and placed on the operating table in supine position.  General anesthesia was administered.  The foot and leg were then prepped and draped in usual sterile manner.  A thigh tourniquet was inflated to 300 mmHg after the foot and leg were exsanguinated using an Esmarch bandage.  Local block was performed using 10 mL of 0.5% plain Marcaine in a local block fashion.  Attention was directed to the Achilles tendon region where a 6 cm posterior linear incision was performed.  It was deepened via sharp and blunt dissection with care to identify and retract all neurovascular structures.  Hemostasis was obtained as needed.  Also, of note, there was a portion of FiberWire, which was protruding from the wound, which was approximately 2-1/2 inches long.  The Achilles tendon was identified.  There was found to be a serous fluid.  There was some purulence deep.  This area was cultured.  The tendon was resected and it include all of the tendon that contained FiberWire.  Tendon was sent  to pathology for routine evaluation.  The tendon was very necrotic in areas.  It contained the FiberWire.  It was also quite scarred.  It did not appear to probe deep.  We did run approximately 2 L of saline using a Pulsavac through the wound.  The wound was then partially closed over iodoform gauze using 3-0 nylon.  A dry sterile compressive dressing consisting of Betadine-soaked Adaptic, 4 x 4s, 4-inch Irish and a size 4-inch Ace wrap was applied.  The patient tolerated the procedure and anesthesia well without complications.  She was given written and oral postoperative instructions and will return to clinic in 10 to 12 days for postop check.  She will, however, be admitted for postoperative care.  We will consult the  for displacement.  I suspect she will go to a transitional care facility due to her age,  infection requiring antibiotics, and lives alone.  We will also get the infectious disease specialist and the hospitalist involved with her postoperative care.    Chan Rodríguez MD        D: 2022   T: 2022   MT: meagan/[email or web address]    Name:     SERGIO MORENODiaz  MRN:      [id number]        Account:        [id number]   :      1934           Procedure Date: 2022     Document: [id number]

## 2022-09-06 NOTE — ANESTHESIA POSTPROCEDURE EVALUATION
Patient: Joyce Brian    Procedure: Procedure(s):  RIGHT ACHILLES TENDON INCISION, DRAINAGE AND DEBRIDEMENT       Anesthesia Type:  General    Note:  Disposition: Inpatient   Postop Pain Control: Uneventful            Sign Out: Well controlled pain   PONV: No   Neuro/Psych: Uneventful            Sign Out: Acceptable/Baseline neuro status   Airway/Respiratory: Uneventful            Sign Out: Acceptable/Baseline resp. status   CV/Hemodynamics: Uneventful            Sign Out: Acceptable CV status; No obvious hypovolemia; No obvious fluid overload   Other NRE: NONE   DID A NON-ROUTINE EVENT OCCUR? No           Last vitals:  Vitals Value Taken Time   /94 09/06/22 1752   Temp 36  C (96.8  F) 09/06/22 1740   Pulse 70 09/06/22 1802   Resp 20 09/06/22 1750   SpO2 95 % 09/06/22 1802   Vitals shown include unvalidated device data.    Electronically Signed By: Ramakrishna Gale MD  September 6, 2022  6:03 PM

## 2022-09-06 NOTE — ANESTHESIA CARE TRANSFER NOTE
Patient: Joyce Brian    Procedure: Procedure(s):  RIGHT ACHILLES TENDON INCISION, DRAINAGE AND DEBRIDEMENT       Diagnosis: Musculoskeletal disorder [M79.9]  Diagnosis Additional Information: No value filed.    Anesthesia Type:   General     Note:    Oropharynx: oropharynx clear of all foreign objects  Level of Consciousness: awake  Oxygen Supplementation: face mask  Level of Supplemental Oxygen (L/min / FiO2): 6  Independent Airway: airway patency satisfactory and stable  Dentition: dentition unchanged  Vital Signs Stable: post-procedure vital signs reviewed and stable  Report to RN Given: handoff report given  Patient transferred to: PACU    Handoff Report: Identifed the Patient, Identified the Reponsible Provider, Reviewed the pertinent medical history, Discussed the surgical course, Reviewed Intra-OP anesthesia mangement and issues during anesthesia, Set expectations for post-procedure period and Allowed opportunity for questions and acknowledgement of understanding      Vitals:  Vitals Value Taken Time   /60 09/06/22 1710   Temp 37.4  C (99.3  F) 09/06/22 1709   Pulse 83 09/06/22 1714   Resp 18 09/06/22 1714   SpO2 98 % 09/06/22 1714   Vitals shown include unvalidated device data.    Electronically Signed By: KOSTAS Abel CRNA  September 6, 2022  5:15 PM

## 2022-09-06 NOTE — INTERVAL H&P NOTE
"I have reviewed the surgical (or preoperative) H&P that is linked to this encounter, and examined the patient. There are no significant changes    Clinical Conditions Present on Arrival:  Clinically Significant Risk Factors Present on Admission                   # Obesity: Estimated body mass index is 32.27 kg/m  as calculated from the following:    Height as of this encounter: 1.626 m (5' 4\").    Weight as of this encounter: 85.3 kg (188 lb).       "

## 2022-09-06 NOTE — PHARMACY-ADMISSION MEDICATION HISTORY
Pharmacy Note - Admission Medication History    Pertinent Provider Information: N/A   ______________________________________________________________________    Prior To Admission (PTA) med list completed and updated in EMR.       PTA Med List   Medication Sig Last Dose     aspirin-acetaminophen-caffeine (EXCEDRIN MIGRAINE) 250-250-65 mg per tablet [ASPIRIN-ACETAMINOPHEN-CAFFEINE (EXCEDRIN MIGRAINE) 250-250-65 MG PER TABLET] Take 1 tablet by mouth every 6 (six) hours as needed for pain. 8/26/2022     cephALEXin (KEFLEX) 500 MG capsule Take 1 capsule (500 mg) by mouth 3 times daily for 14 days 9/5/2022 at PM     cholecalciferol, vitamin D3, (VITAMIN D3 ORAL) [CHOLECALCIFEROL, VITAMIN D3, (VITAMIN D3 ORAL)] Take 4,000 Units by mouth daily.  Past Week at Unknown time     gabapentin (NEURONTIN) 100 MG capsule Take 200 mg by mouth At Bedtime 9/5/2022 at PM     hydroxychloroquine (PLAQUENIL) 200 MG tablet Take 200 mg by mouth daily 9/5/2022 at AM     losartan (COZAAR) 50 MG tablet Take 50 mg by mouth daily 9/6/2022 at AM     omeprazole (PRILOSEC) 20 MG capsule [OMEPRAZOLE (PRILOSEC) 20 MG CAPSULE] Take 20 mg by mouth daily before breakfast. 9/6/2022 at AM     pramipexole (MIRAPEX) 0.25 MG tablet Take 0.5 mg by mouth At Bedtime 9/5/2022 at PM     predniSONE (DELTASONE) 2.5 MG tablet Take 2.5 mg by mouth daily 9/5/2022 at AM     SUMAtriptan (IMITREX) 50 MG tablet Take 50 mg by mouth every 2 hours as needed for migraine Unknown at Unknown time       Information source(s): Patient and Clinic records    Method of interview communication: in-person    Patient was asked about OTC/herbal products specifically.  PTA med list reflects this.    Based on the pharmacist's assessment, the PTA med list information appears reliable    Allergies were reviewed, assessed, and updated with the patient.      Patient does not use any multi-dose medications prior to admission.     Thank you for the opportunity to participate in the care of this  patient.      Floyd Singer, [facility]     9/6/2022     2:18 PM

## 2022-09-06 NOTE — BRIEF OP NOTE
[facility]    Brief Operative Note    Pre-operative diagnosis: Musculoskeletal disorder [M79.9]  Post-operative diagnosis achilles tendon infection right    Procedure: Procedure(s):  RIGHT ACHILLES TENDON INCISION, DRAINAGE AND DEBRIDEMENT  Surgeon: Surgeon(s) and Role:     * Chan Rodríguez DPM - Primary  Anesthesia: General   Estimated Blood Loss: Minimal    Drains: None  Specimens:   ID Type Source Tests Collected by Time Destination   1 : Section Right Achilles Tendon Tissue Leg, Right SURGICAL PATHOLOGY EXAM Chan Rodríguez DPM 9/6/2022  4:36 PM    A : Right achilles tendon infection Swab Leg, Right ANAEROBIC BACTERIAL CULTURE ROUTINE, AEROBIC BACTERIAL CULTURE ROUTINE Chan Rodríguez DPM 9/6/2022  4:35 PM      Findings:   None.  Complications: None.  Implants: * No implants in log *

## 2022-09-06 NOTE — ANESTHESIA PREPROCEDURE EVALUATION
Anesthesia Pre-Procedure Evaluation    Patient: Joyce Brian   MRN: [id number] : 1934        Procedure : Procedure(s):  RIGHT ACHILLES TENDON INCISION, DRAINAGE AND DEBRIDEMENT          Past Medical History:   Diagnosis Date     Acid reflux      Anemia      Arthritis      BPPV (benign paroxysmal positional vertigo)      Cerebral artery occlusion with cerebral infarction (H)      Dyspnea on exertion      History of anesthesia complications      History of blood transfusion      Hypertension      Lung nodules      Mild intermittent asthma      Polymyalgia rheumatica (H)      PONV (postoperative nausea and vomiting)      Restless leg syndrome      Sjogren's syndrome (H)      Sleep apnea      TIA (transient ischemic attack) 2012      Past Surgical History:   Procedure Laterality Date     ANKLE SURGERY       BLADDER SURGERY       HYSTERECTOMY       INCISION AND DRAINAGE OF WOUND Right 2020    Procedure: INCISION AND DRAINAGE ACHILLES TENDON, RIGHT ANKLE;  Surgeon: Chan Rodríguez DPM;  Location: [facility];  Service: Podiatry     JOINT REPLACEMENT Bilateral     hips and knees     PICC  2020          RELEASE CARPAL TUNNEL       TONSILLECTOMY        Allergies   Allergen Reactions     Augmentin      Amlodipine Diarrhea     Ciprofloxacin Other (See Comments)     Tendon injury     Doxycycline Nausea and Vomiting     Duloxetine Nausea     Levofloxacin Nausea and Vomiting     Nitrofurantoin Nausea and Vomiting     Phenothiazines Other (See Comments)     Worsening of restless legs     Strawberry Hives     Sulfa (Sulfonamide Antibiotics) [Sulfa Drugs] Unknown     Walnuts [Black [place] Pollen Allergy Skin Test] Hives     Morphine Rash      Social History     Tobacco Use     Smoking status: Never Smoker     Smokeless tobacco: Never Used   Substance Use Topics     Alcohol use: Not Currently      Wt Readings from Last 1 Encounters:   22 85.3 kg (188 lb)        Anesthesia Evaluation   Pt has had  prior anesthetic. Type: General.    History of anesthetic complications  - PONV.      ROS/MED HX  ENT/Pulmonary:     (+) sleep apnea, asthma     Neurologic:     (+) TIA,     Cardiovascular:     (+) hypertension-----GARLAND. dysrhythmias (RBBB),     METS/Exercise Tolerance:     Hematologic:       Musculoskeletal:       GI/Hepatic:     (+) GERD, Asymptomatic on medication,     Renal/Genitourinary:       Endo:     (+) Chronic steroid usage for Other.     Psychiatric/Substance Use:       Infectious Disease:       Malignancy:       Other:            Physical Exam    Airway  airway exam normal       TM distance: > 3 FB   Neck ROM: full     Respiratory Devices and Support         Dental       (+) lower dentures      Cardiovascular   cardiovascular exam normal          Pulmonary   pulmonary exam normal                OUTSIDE LABS:  CBC:   Lab Results   Component Value Date    WBC 9.0 08/29/2022    WBC 8.6 08/01/2022    HGB 12.2 08/29/2022    HGB 12.5 08/01/2022    HCT 38.8 08/29/2022    HCT 40.0 08/01/2022     08/29/2022     08/01/2022     BMP:   Lab Results   Component Value Date     08/29/2022     08/01/2022    POTASSIUM 4.4 08/29/2022    POTASSIUM 4.7 08/01/2022    CHLORIDE 104 08/29/2022    CHLORIDE 104 08/01/2022    CO2 25 08/29/2022    CO2 25 08/01/2022    BUN 9.9 08/29/2022    BUN 11.2 08/01/2022    CR 0.85 08/29/2022    CR 0.83 08/01/2022     (H) 08/29/2022     (H) 08/01/2022     COAGS: No results found for: PTT, INR, FIBR  POC: No results found for: BGM, HCG, HCGS  HEPATIC:   Lab Results   Component Value Date    ALBUMIN 4.0 08/29/2022    PROTTOTAL 7.7 08/29/2022    ALT 20 08/29/2022    AST 23 08/29/2022    ALKPHOS 84 08/29/2022    BILITOTAL 0.6 08/29/2022     OTHER:   Lab Results   Component Value Date    MERCY 9.7 08/29/2022    CRP 3.18 08/29/2022    SED 44 (H) 08/29/2022       Anesthesia Plan    ASA Status:  3   NPO Status:  NPO Appropriate    Anesthesia Type: General.     -  Airway: ETT   Induction: Intravenous.           Consents    Anesthesia Plan(s) and associated risks, benefits, and realistic alternatives discussed. Questions answered and patient/representative(s) expressed understanding.     - Discussed: Risks, Benefits and Alternatives for BOTH SEDATION and the PROCEDURE were discussed     - Discussed with:  Patient         Postoperative Care       PONV prophylaxis: Dexamethasone or Solumedrol, Background Propofol Infusion, Ondansetron (or other 5HT-3)     Comments:                Cedric Medina MD

## 2022-09-07 ENCOUNTER — APPOINTMENT (OUTPATIENT)
Dept: PHYSICAL THERAPY | Facility: CLINIC | Age: 87
DRG: 501 | End: 2022-09-07
Attending: PODIATRIST
Payer: COMMERCIAL

## 2022-09-07 LAB
GLUCOSE BLDC GLUCOMTR-MCNC: 133 MG/DL (ref 70–99)
HGB BLD-MCNC: 11.6 G/DL (ref 11.7–15.7)
HOLD SPECIMEN: NORMAL

## 2022-09-07 PROCEDURE — 82962 GLUCOSE BLOOD TEST: CPT

## 2022-09-07 PROCEDURE — 250N000011 HC RX IP 250 OP 636: Performed by: PODIATRIST

## 2022-09-07 PROCEDURE — 36415 COLL VENOUS BLD VENIPUNCTURE: CPT | Performed by: PODIATRIST

## 2022-09-07 PROCEDURE — 99222 1ST HOSP IP/OBS MODERATE 55: CPT | Performed by: INTERNAL MEDICINE

## 2022-09-07 PROCEDURE — 250N000012 HC RX MED GY IP 250 OP 636 PS 637: Performed by: FAMILY MEDICINE

## 2022-09-07 PROCEDURE — 250N000013 HC RX MED GY IP 250 OP 250 PS 637: Performed by: FAMILY MEDICINE

## 2022-09-07 PROCEDURE — 97116 GAIT TRAINING THERAPY: CPT | Mod: GP

## 2022-09-07 PROCEDURE — 96372 THER/PROPH/DIAG INJ SC/IM: CPT | Performed by: PODIATRIST

## 2022-09-07 PROCEDURE — 97530 THERAPEUTIC ACTIVITIES: CPT | Mod: GP

## 2022-09-07 PROCEDURE — 120N000001 HC R&B MED SURG/OB

## 2022-09-07 PROCEDURE — 85018 HEMOGLOBIN: CPT | Performed by: PODIATRIST

## 2022-09-07 PROCEDURE — 250N000013 HC RX MED GY IP 250 OP 250 PS 637: Performed by: PODIATRIST

## 2022-09-07 PROCEDURE — 99232 SBSQ HOSP IP/OBS MODERATE 35: CPT | Performed by: FAMILY MEDICINE

## 2022-09-07 PROCEDURE — 97162 PT EVAL MOD COMPLEX 30 MIN: CPT | Mod: GP

## 2022-09-07 RX ORDER — CEFAZOLIN SODIUM 2 G/100ML
2 INJECTION, SOLUTION INTRAVENOUS EVERY 8 HOURS
Status: DISCONTINUED | OUTPATIENT
Start: 2022-09-07 | End: 2022-09-08

## 2022-09-07 RX ADMIN — CEFAZOLIN SODIUM 2 G: 2 INJECTION, SOLUTION INTRAVENOUS at 13:37

## 2022-09-07 RX ADMIN — CEFAZOLIN SODIUM 2 G: 2 INJECTION, SOLUTION INTRAVENOUS at 20:59

## 2022-09-07 RX ADMIN — PRAMIPEXOLE DIHYDROCHLORIDE 0.5 MG: 0.25 TABLET ORAL at 20:59

## 2022-09-07 RX ADMIN — PREDNISONE 2.5 MG: 2.5 TABLET ORAL at 08:28

## 2022-09-07 RX ADMIN — ACETAMINOPHEN 975 MG: 325 TABLET, COATED ORAL at 13:37

## 2022-09-07 RX ADMIN — GABAPENTIN 200 MG: 100 CAPSULE ORAL at 20:59

## 2022-09-07 RX ADMIN — ENOXAPARIN SODIUM 40 MG: 100 INJECTION SUBCUTANEOUS at 11:56

## 2022-09-07 RX ADMIN — SENNOSIDES AND DOCUSATE SODIUM 1 TABLET: 50; 8.6 TABLET ORAL at 08:27

## 2022-09-07 RX ADMIN — POLYETHYLENE GLYCOL 3350 17 G: 17 POWDER, FOR SOLUTION ORAL at 08:21

## 2022-09-07 RX ADMIN — CEFAZOLIN SODIUM 2 G: 2 INJECTION, SOLUTION INTRAVENOUS at 05:09

## 2022-09-07 RX ADMIN — HYDROXYCHLOROQUINE SULFATE 200 MG: 200 TABLET, FILM COATED ORAL at 08:23

## 2022-09-07 RX ADMIN — LOSARTAN POTASSIUM 50 MG: 50 TABLET, FILM COATED ORAL at 08:24

## 2022-09-07 RX ADMIN — ACETAMINOPHEN 975 MG: 325 TABLET, COATED ORAL at 05:08

## 2022-09-07 RX ADMIN — ACETAMINOPHEN 975 MG: 325 TABLET, COATED ORAL at 21:00

## 2022-09-07 RX ADMIN — PANTOPRAZOLE SODIUM 40 MG: 20 TABLET, DELAYED RELEASE ORAL at 06:45

## 2022-09-07 ASSESSMENT — ACTIVITIES OF DAILY LIVING (ADL)
ADLS_ACUITY_SCORE: 44
ADLS_ACUITY_SCORE: 44
ADLS_ACUITY_SCORE: 39
ADLS_ACUITY_SCORE: 40
ADLS_ACUITY_SCORE: 39
ADLS_ACUITY_SCORE: 39
ADLS_ACUITY_SCORE: 40
ADLS_ACUITY_SCORE: 44
ADLS_ACUITY_SCORE: 40
ADLS_ACUITY_SCORE: 40
ADLS_ACUITY_SCORE: 39
ADLS_ACUITY_SCORE: 44

## 2022-09-07 NOTE — CONSULTS
[facility] MEDICINE CONSULT NOTE   Physician requesting consult: Chan Rodríguez DPM    Reason for consult: Postoperative medical management of medical co-morbidities as below    Identification/Summary:   Joyce Brian is a 88 year old female with a PMH of CVA, HTN, KAI, GERD, asthma, PMR, RLS, Sjogren's and migraine headaches.  Underwent right Achilles tendon I&D by Dr. Chan Rodríguez.     Assessment and Plan:  -Recurrent right Achilles tendinitis  -Status post right Achilles tendon I&D 9/6/2022  Further management per podiatry.  Had been on Keflex prior to admission.  Currently has Ancef ordered.  Infectious diseases been consulted.  Further antibiotic management per the recommendations.  -Essential hypertension  -CVA history  Denies any deficits leftover from her stroke.  Order losartan 50 mg daily with hold parameters.  -Migraine headaches  Patient has taken Excedrin Migraine and Imitrex in the past.  No issues at this time.  Follow clinically.  -Polymyalgia rheumatica  -Restless leg syndrome  Order home gabapentin 200 mg nightly, Mirapex 0.5 mg nightly and prednisone 2.5 mg daily.  -Sjogren's syndrome  Order home Plaquenil 200 mg daily  -GERD  Order home omeprazole.  -Obstructive sleep apnea  Patient does not utilize CPAP.  Follow pulse oximetry and provide supplemental oxygen as needed.    COVID-19 PCR negative from 9/2/2022  Noted.  Standard precautions  Anticoagulation.    Lovenox 40 mg every 24 hours ordered by surgery.  Routine postoperative risk.  Fluids: Saline lock  Pain meds: Per orthopedics  Therapy: Per orthopedics  Summers:Not present  Current Diet  Orders Placed This Encounter      Regular Diet Adult    Supplements  Active Nourishment Order   Procedures     Room Service       -Disposition   per orthopedics and ID recommendations    Code status:Full Code   [facility] service was asked to evaluate patient for postoperative medical management as follows below. Please resume the home  "medications as reconciled and further noted with ordered hold parameters.  Thank you for this consult; we will continue to follow this patient until discharge.    Procedure(s):  RIGHT ACHILLES TENDON INCISION, DRAINAGE AND DEBRIDEMENT  Day of Surgery  Estimated Blood Loss:  1 mL  Hospital Problem List   No problem-specific Assessment & Plan notes found for this encounter.    Active Problems:    S/P foot surgery      -Reviewed the patient's preoperative H and P and updated missing elements.  -Home medication reconciliation has been reviewed.  Medications have been ordered as noted from the home list and changes are documented above     Clinically Significant Risk Factors Present on Admission                 # Hypertension: home medication list includes antihypertensive(s)    # Obesity: Estimated body mass index is 32.27 kg/m  as calculated from the following:    Height as of this encounter: 1.626 m (5' 4\").    Weight as of this encounter: 85.3 kg (188 lb).        HISTORY     Joyce Brian is a 88 year old female with PMH of  CVA, HTN, KAI, GERD, asthma, PMR, RLS, Sjogren's and migraine headaches.  Underwent right Achilles tendon I&D by Dr. Chan Rodríguez for recurrent/persistent right Achilles tendinitis.  COVID-19 PCR negative from 9/2/2022.  Patient doing well postoperatively.  Pain under good control.  No chest pain.  No shortness of breath.  No nausea or vomiting.  No lightheadedness or dizziness.  Patient notes that she does not utilize CPAP for sleep apnea.  Denies any deficits from her stroke.  Denies personal history of heart attack, cancer, diabetes, DVT, PE, peptic ulcer disease..  Questions answered to verbalized satisfaction.    Past Medical History     Past Medical History:  No date: Acid reflux  No date: Anemia  No date: Arthritis  No date: BPPV (benign paroxysmal positional vertigo)  No date: Cerebral artery occlusion with cerebral infarction (H)  No date: Dyspnea on exertion  No date: History of " anesthesia complications  No date: History of blood transfusion  No date: Hypertension  No date: Lung nodules  No date: Mild intermittent asthma  No date: Polymyalgia rheumatica (H)  No date: PONV (postoperative nausea and vomiting)  No date: Restless leg syndrome  No date: Sjogren's syndrome (H)  No date: Sleep apnea  01/01/2012: TIA (transient ischemic attack)     Patient Active Problem List    Diagnosis Date Noted     S/P foot surgery 09/06/2022     Priority: Medium     Gram-negative infection      Priority: Medium     Achilles tendon infection 08/14/2020     Priority: Medium     Polymyalgia rheumatica (H)      Priority: Medium     Mild intermittent asthma      Priority: Medium     Hypertension      Priority: Medium     Surgical History     Past Surgical History:   Procedure Laterality Date     ANKLE SURGERY       BLADDER SURGERY       HYSTERECTOMY       INCISION AND DRAINAGE OF WOUND Right 8/13/2020    Procedure: INCISION AND DRAINAGE ACHILLES TENDON, RIGHT ANKLE;  Surgeon: Chan Rodríguez DPM;  Location: [facility];  Service: Podiatry     JOINT REPLACEMENT Bilateral     hips and knees     PICC  8/16/2020          RELEASE CARPAL TUNNEL       TONSILLECTOMY       Family History      Family History   Problem Relation Age of Onset     Breast Cancer Mother       Social History      Social History     Tobacco Use     Smoking status: Never Smoker     Smokeless tobacco: Never Used   Substance Use Topics     Alcohol use: Not Currently     Drug use: Never      Allergies     Allergies   Allergen Reactions     Augmentin      Amlodipine Diarrhea     Ciprofloxacin Other (See Comments)     Tendon injury     Doxycycline Nausea and Vomiting     Duloxetine Nausea     Levofloxacin Nausea and Vomiting     Nitrofurantoin Nausea and Vomiting     Phenothiazines Other (See Comments)     Worsening of restless legs     Strawberry Hives     Sulfa (Sulfonamide Antibiotics) [Sulfa Drugs] Unknown     Walnuts [Black [place] Pollen  Allergy Skin Test] Hives     Morphine Rash       Prior to Admission Medications      Prior to Admission Medications   Prescriptions Last Dose Informant Patient Reported? Taking?   SUMAtriptan (IMITREX) 50 MG tablet Unknown at Unknown time  Yes Yes   Sig: Take 50 mg by mouth every 2 hours as needed for migraine   aspirin-acetaminophen-caffeine (EXCEDRIN MIGRAINE) 250-250-65 mg per tablet 8/26/2022  Yes Yes   Sig: [ASPIRIN-ACETAMINOPHEN-CAFFEINE (EXCEDRIN MIGRAINE) 250-250-65 MG PER TABLET] Take 1 tablet by mouth every 6 (six) hours as needed for pain.   cephALEXin (KEFLEX) 500 MG capsule 9/5/2022 at PM  No Yes   Sig: Take 1 capsule (500 mg) by mouth 3 times daily for 14 days   cholecalciferol, vitamin D3, (VITAMIN D3 ORAL) Past Week at Unknown time  Yes Yes   Sig: [CHOLECALCIFEROL, VITAMIN D3, (VITAMIN D3 ORAL)] Take 4,000 Units by mouth daily.    gabapentin (NEURONTIN) 100 MG capsule 9/5/2022 at PM  Yes Yes   Sig: Take 200 mg by mouth At Bedtime   hydroxychloroquine (PLAQUENIL) 200 MG tablet 9/5/2022 at AM  Yes Yes   Sig: Take 200 mg by mouth daily   losartan (COZAAR) 50 MG tablet 9/6/2022 at AM  Yes Yes   Sig: Take 50 mg by mouth daily   omeprazole (PRILOSEC) 20 MG capsule 9/6/2022 at AM  Yes Yes   Sig: [OMEPRAZOLE (PRILOSEC) 20 MG CAPSULE] Take 20 mg by mouth daily before breakfast.   pramipexole (MIRAPEX) 0.25 MG tablet 9/5/2022 at PM  Yes Yes   Sig: Take 0.5 mg by mouth At Bedtime   predniSONE (DELTASONE) 2.5 MG tablet 9/5/2022 at AM  Yes Yes   Sig: Take 2.5 mg by mouth daily      Facility-Administered Medications: None      Review of Systems     A 12 point comprehensive review of systems was negative except as noted above in HPI.    OBJECTIVE         Physical Exam   Temp:  [96.8  F (36  C)-99.3  F (37.4  C)] 97.9  F (36.6  C)  Pulse:  [64-92] 79  Resp:  [16-25] 18  BP: (124-183)/(57-94) 168/72  SpO2:  [92 %-99 %] 92 %  Body mass index is 32.27 kg/m .  Constitutional: awake, alert, cooperative, no apparent  distress, and appears stated age  Eyes: Lids and lashes normal, pupils equal, round and reactive to light, extra ocular muscles intact, sclera clear, conjunctiva normal  ENT: Normocephalic, without obvious abnormality, atraumatic, sinuses nontender on palpation, external ears without lesions, oral pharynx with moist mucous membranes, tonsils without erythema or exudates, gums normal and good dentition.  Hematologic / Lymphatic: no cervical lymphadenopathy and no supraclavicular lymphadenopathy  Respiratory: No increased work of breathing, good air exchange, clear to auscultation bilaterally, no crackles or wheezing  Cardiovascular: Normal apical impulse, regular rate and rhythm, normal S1 and S2, no S3 or S4, and no murmur noted  GI: No scars, normal bowel sounds, soft, non-distended, non-tender, no masses palpated, no hepatosplenomegally  Skin: normal skin color, texture, turgor, no redness, warmth, or swelling, and no rashes  Musculoskeletal: There is no redness, warmth, or swelling of the joints.  Full range of motion noted.  Motor strength is 5 out of 5 all extremities bilaterally.  Tone is normal.  Limited exam to right lower extremity secondary to surgical dressing.  Good sensation at the toes.  Warm and good capillary refill no lower extremity pitting edema present  Neurologic: Cranial nerves II-XII are grossly intact. Sensory:  Sensory intact  Neuropsychiatric: General: normal, calm and normal eye contact Level of consciousness: alert / normal Affect: normal Orientation: oriented to self, place, time and situation Memory and insight: normal, memory for past and recent events intact and thought process normal      Cardiographics Reviewed Personally By Myself         Echocardiogram: 5/7/2020 [facility]  Normal left ventricular size and systolic function.  EF 60%.  Mild LVH.  Normal RV size and function.  Trace valvular regurgitations.  Negative bubble study.    Imaging Reviewed Personally By Myself   "    Radiology Results:   Recent Results (from the past 24 hour(s))   POC US Guidance Needle Placement    Narrative    Ultrasound was performed as guidance to an anesthesia procedure.  Click   \"PACS images\" hyperlink below to view any stored images.  For specific   procedure details, view procedure note authored by anesthesia.       Labs Reviewed Personally By Myself     Results for orders placed or performed during the hospital encounter of 09/06/22 (from the past 24 hour(s))   POC US Guidance Needle Placement    Narrative    Ultrasound was performed as guidance to an anesthesia procedure.  Click   \"PACS images\" hyperlink below to view any stored images.  For specific   procedure details, view procedure note authored by anesthesia.   Creatinine   Result Value Ref Range    Creatinine 0.89 0.60 - 1.10 mg/dL    GFR Estimate 62 >60 mL/min/1.73m2       Preoperative Labs Reviewed Personally By Myself     COVID-19 PCR negative from 9/2/2022     Latest Reference Range & Units 08/29/22 15:46   Sodium 136 - 145 mmol/L 138   Potassium 3.4 - 5.3 mmol/L 4.4   Chloride 98 - 107 mmol/L 104   Carbon Dioxide (CO2) 22 - 29 mmol/L 25   Urea Nitrogen 8.0 - 23.0 mg/dL 9.9   Creatinine 0.51 - 0.95 mg/dL 0.85   GFR Estimate >60 mL/min/1.73m2 66   Calcium 8.8 - 10.2 mg/dL 9.7   Anion Gap 7 - 15 mmol/L 9   Albumin 3.5 - 5.2 g/dL 4.0   Protein Total 6.4 - 8.3 g/dL 7.7   Alkaline Phosphatase 35 - 104 U/L 84   ALT 10 - 35 U/L 20   AST 10 - 35 U/L 23   Bilirubin Total <=1.2 mg/dL 0.6   CRP Inflammation <5.00 mg/L 3.18   Glucose 70 - 99 mg/dL 113 (H)   WBC 4.0 - 11.0 10e3/uL 9.0   Hemoglobin 11.7 - 15.7 g/dL 12.2   Hematocrit 35.0 - 47.0 % 38.8   Platelet Count 150 - 450 10e3/uL 332   RBC Count 3.80 - 5.20 10e6/uL 4.66   MCV 78 - 100 fL 83   MCH 26.5 - 33.0 pg 26.2 (L)   MCHC 31.5 - 36.5 g/dL 31.4 (L)   RDW 10.0 - 15.0 % 14.3   % Neutrophils % 73   % Lymphocytes % 20   % Monocytes % 5   % Eosinophils % 0   % Basophils % 0   Absolute Basophils " 0.0 - 0.2 10e3/uL 0.0   Absolute Eosinophils 0.0 - 0.7 10e3/uL 0.0   Absolute Immature Granulocytes <=0.4 10e3/uL 0.1   Absolute Lymphocytes 0.8 - 5.3 10e3/uL 1.8   Absolute Monocytes 0.0 - 1.3 10e3/uL 0.5   % Immature Granulocytes % 2   Absolute Neutrophils 1.6 - 8.3 10e3/uL 6.5   Sed Rate 0 - 20 mm/hr 44 (H)   (H): Data is abnormally high  (L): Data is abnormally low      Thank you for this consultation.  Appreciate the opportunity to participate in the care of Joyce BRADLEY Brian, please feel free to contact us for any questions or concerns.    Isaac Paul MD  [facility]  Phone: #[phone]

## 2022-09-07 NOTE — PROGRESS NOTES
Dr. Berry(ID) at bedside. Instructed RN to continue giving the ANCEF until new order is received. Orthotics at bedside trying different off loading boot for Patient.  -Aleja NOBLE RN

## 2022-09-07 NOTE — PROGRESS NOTES
"Joyce Brian is a 88 year old female patient.  Comfortable without concerns.  No fever, chills or sweats.  Tolerating PO.  1. Achilles tendon infection      Past Medical History:   Diagnosis Date     Acid reflux      Anemia      Arthritis      BPPV (benign paroxysmal positional vertigo)      Cerebral artery occlusion with cerebral infarction (H)      Dyspnea on exertion      History of anesthesia complications      History of blood transfusion      Hypertension      Lung nodules      Migraine headache 9/6/2022     Mild intermittent asthma      Polymyalgia rheumatica (H)      PONV (postoperative nausea and vomiting)      Restless leg syndrome      Sjogren's syndrome (H)      Sleep apnea      TIA (transient ischemic attack) 01/01/2012     No current outpatient medications on file.     Allergies   Allergen Reactions     Augmentin      Amlodipine Diarrhea     Ciprofloxacin Other (See Comments)     Tendon injury     Doxycycline Nausea and Vomiting     Duloxetine Nausea     Levofloxacin Nausea and Vomiting     Nitrofurantoin Nausea and Vomiting     Phenothiazines Other (See Comments)     Worsening of restless legs     Strawberry Hives     Sulfa (Sulfonamide Antibiotics) [Sulfa Drugs] Unknown     Walnuts [Black [place] Pollen Allergy Skin Test] Hives     Morphine Rash     Principal Problem:    S/P foot surgery  Active Problems:    Polymyalgia rheumatica (H)    Mild intermittent asthma    Hypertension    Achilles tendon infection    Migraine headache    Blood pressure (!) 145/67, pulse 57, temperature 98.1  F (36.7  C), temperature source Oral, resp. rate 18, height 1.626 m (5' 4\"), weight 85.3 kg (188 lb), SpO2 94 %.    Subjective  Objective  Assessment & Plan   1)  Continue RICE.  2)  ID consulted as well as .  3)  Dressing clean, dry and intact.  Will change in the as Thursday.  4)  Continue Ancef 2g IV until ID suggestions.    Chan Rodríguez, DOMINGUEZ  9/7/2022    "

## 2022-09-07 NOTE — PLAN OF CARE
Problem: Plan of Care - These are the overarching goals to be used throughout the patient stay.    Goal: Absence of Hospital-Acquired Illness or Injury  Intervention: Prevent and Manage VTE (Venous Thromboembolism) Risk  Recent Flowsheet Documentation  Taken 9/7/2022 0807 by Aleja Callejas RN  Activity Management:    activity adjusted per tolerance    ambulated to bathroom    Goal Outcome Evaluation:  Off loading boot in placed by orthotics.  Patient has been up to the bathroom standby assistive 1 with a walker and transferbelt.  Denied pain most of the shift refused pain meds.  Infectious disease following, continue on IV Ancef.  Dressing to stay in placed per Dr. Rodríguez.  Patient denied any immediate needs at this time.  Will continue to monitor.    -Aleja NOVA RN

## 2022-09-07 NOTE — PROGRESS NOTES
09/07/22 0845   Quick Adds   Quick Adds Certification   Type of Visit Initial PT Evaluation   Living Environment   People in Home alone   Current Living Arrangements independent living facility   Self-Care   Usual Activity Tolerance good   Current Activity Tolerance fair   Equipment Currently Used at Home walker, rolling   Fall history within last six months yes   Number of times patient has fallen within last six months 2   General Information   Onset of Illness/Injury or Date of Surgery 09/06/22   Referring Physician Dr. Rodríguez   Patient/Family Therapy Goals Statement (PT) Improve stability with mobility   Pertinent History of Current Problem (include personal factors and/or comorbidities that impact the POC) s/p achilles tendon I & D   Existing Precautions/Restrictions weight bearing   Weight-Bearing Status - LLE full weight-bearing   Weight-Bearing Status - RLE toe touch weight-bearing   Range of Motion (ROM)   ROM Comment WFL, decreased RLE due to swelling, pain, WB restrictions   Strength (Manual Muscle Testing)   Strength Comments WFL, decreased due to inability to use RLE   Bed Mobility   Bed Mobility supine-sit;sit-supine   Supine-Sit [place] (Bed Mobility) supervision   Sit-Supine [place] (Bed Mobility) supervision   Transfers   Transfers sit-stand transfer   Sit-Stand Transfer   Sit-Stand [place] (Transfers) minimum assist (75% patient effort);2 person assist   Gait/Stairs (Locomotion)   [place] Level (Gait) contact guard   Assistive Device (Gait) walker, knee scooter   Distance in Feet (Required for LE Total Joints) 10'   Pattern (Gait) swing-to   Deviations/Abnormal Patterns (Gait) efraín decreased;gait speed decreased   Maintains Weight-bearing Status (Gait) able to maintain   Clinical Impression   Criteria for Skilled Therapeutic Intervention Yes, treatment indicated   PT Diagnosis (PT) Impaired functional mobility   Influenced by the following impairments Weakness, pain, WB  restrictions   Functional limitations due to impairments Transfers, gait   Clinical Presentation (PT Evaluation Complexity) Stable/Uncomplicated   Clinical Presentation Rationale Pt presents as medically diagnosed   Clinical Decision Making (Complexity) moderate complexity   Planned Therapy Interventions (PT) gait training;home exercise program;patient/family education;ROM (range of motion);strengthening   Anticipated Equipment Needs at Discharge (PT) other (see comments)  (Knee scooter)   Risk & Benefits of therapy have been explained care plan/treatment goals reviewed;patient   PT Discharge Planning   PT Discharge Recommendation (DC Rec) (S)  home with assist;Transitional Care Facility   PT Rationale for DC Rec Pt able to ambulate well with knee scooter, but will likely need assist with ADLs, pending OT evaluation. Pt lives alone and would need increased supports. Talked to pt about potential for family to help out. Pt has rented knee scooter in the past and would have to do so again. Otherwise pt will need TCU to improve functional mobility prior to d/c home.   Plan of Care Review   Plan of Care Reviewed With patient   Therapy Certification   Start of care date 09/07/22   Certification date from 09/07/22   Certification date to 10/07/22   Medical Diagnosis Achilles Tendon I & D   Total Evaluation Time   Total Evaluation Time (Minutes) 10   Physical Therapy Goals   PT Frequency Daily   PT Predicted Duration/Target Date for Goal Attainment 09/12/22   PT Goals Transfers;Gait   PT: Transfers Supervision/stand-by assist;Sit to/from stand   PT: Gait Supervision/stand-by assist;Assistive device;150 feet  (Knee scooter)

## 2022-09-07 NOTE — PROGRESS NOTES
PRIMARY DIAGNOSIS: SOFT TISSUE INFECTIONS  OUTPATIENT/OBSERVATION GOALS TO BE MET BEFORE DISCHARGE:  1. Vitals sign stable or return to baseline: No    2. Tolerating oral antibiotics or has home infusion set up if applicable: No    3. Pain status: Improved-controlled with oral pain medications.    4. Return to near baseline physical activity: No    Discharge Planner Nurse   Safe discharge environment identified: No  Barriers to discharge: Yes       Entered by: JEFF HUYNH RN 09/06/2022 10:42 PM     Please review provider order for any additional goals.     Welcomed to 2N at 1830. Alert and oriented x 4, c/o pain in RLE/ankle at 6/10. High BPs post-op, returned to mid 140's systolic within 1.5 hours. Gave Oxy 2.5mg. LR at 50mL, Ancef IV given at 2200. Regular diet, ate 50% of dinner. Up to BSC with 1x assist, gait belt and walker x2 to void. CMS checked q 4 hours, all WNL. RLE is wrapped, incision UTV. PIV in left inside wrist, positional and slight bruising noted.

## 2022-09-07 NOTE — PROGRESS NOTES
Received a call from Dr. Rordíguez Team at the Podiatry clinic, spoke to Marni to Dr. Paul that Dr. Rodríguez ok to continue Patient on IV Ancef until Patient is seen by Infectious Disease Team, and RN to enter order per telephone order with christine. RN confirmed with Dr. Rodríguez Team to place order as previously ordered, noted no change at this time. Dr. Paul at bedside for AM roundings, RN relayed the response of Dr. Rodríguez about  Ancef. Noted Dr. Paul verbalized in agreement of thew tx plan. Will continue to monitor.

## 2022-09-07 NOTE — PROGRESS NOTES
S: Pt. seen at [facility]. Female. Dr. Rodríguez. RX: Off-loading boot. DX: Achilles tendon surgery.  O:A: Today I F/D DH offloading shoe size Small right. I made a relief in the calcaneal plantar surface of foot to offload the heel. Donning doffing wear and care instructions given.  P: Pt. to be seen as needed.    Ryan CHI,WAGNER

## 2022-09-07 NOTE — PROGRESS NOTES
[facility]      OUTPATIENT PHYSICAL THERAPY EVALUATION  PLAN OF TREATMENT FOR OUTPATIENT REHABILITATION  (COMPLETE FOR INITIAL CLAIMS ONLY)  Patient's Last Name, First Name, M.I.  YOB: 1934  Joyce Brian                        Provider's Name  [facility] Medical Record No.  [id number]                               Onset Date:  09/06/22   Start of Care Date:  09/07/22      Type:     _X_PT   ___OT   ___SLP Medical Diagnosis:  Achilles Tendon I & D                        PT Diagnosis:  Impaired functional mobility   Visits from SOC:  1   _________________________________________________________________________________  Plan of Treatment/Functional Goals    Planned Interventions: gait training, home exercise program, patient/family education, ROM (range of motion), strengthening     Goals: See Physical Therapy Goals on Care Plan in [employer] electronic health record.    Therapy Frequency: Daily  Predicted Duration of Therapy Intervention: 09/12/22  _________________________________________________________________________________    I CERTIFY THE NEED FOR THESE SERVICES FURNISHED UNDER        THIS PLAN OF TREATMENT AND WHILE UNDER MY CARE     (Physician co-signature of this document indicates review and certification of the therapy plan).              Certification date from: 09/07/22, Certification date to: 10/07/22    Referring Physician: Dr. Rodríguez            Initial Assessment        See Physical Therapy evaluation dated 09/07/22 in Epic electronic health record.

## 2022-09-07 NOTE — CONSULTS
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 coli infection back in 2020 see cultures below  Now s/p Achilles tendon debridement and resection 9/6, FiberWire removed   (FiberWire suture is a new generation of polyester suture with a long chain polyethylene core)  Polymyalgia rheumatica/jaw joint syndrome  Quinolones not a good option with Achilles tendon disease  No evidence for fracture and there is no evidence for osteomyelitis or significant effusion to suggest septic arthropathy.    PLAN  IV ceftriaxone  I added fungal and AFB cultures  Follow cultures and adjust  Likely combination of IV then oral antibiotics ourse  LAbs      Nat Berry MD  [facility]  ______________________________________________________________________        History of Present Illness    88 years old female with history of Sjogren's syndrome, CVA, right Achilles tendon infection as far back as 2020 with E. coli multiple species treated at the time with IV ceftriaxone  Followed with Dr. Barreto saw her last month for new infection in the Achilles area.  MRIs were ordered.  Dr. Rodríguez from podiatry operated on her in 2020 and now, yesterday when she underwent Achilles tendon debridement and resection.    Also, of note, there was a portion of FiberWire, which was protruding from the wound, which was approximately 2-1/2 inches long.  The Achilles tendon was identified.  There was found to be a serous fluid.  There was some purulence deep.  This area was cultured.  The tendon was resected and it include all of the tendon that contained  FiberWire.  Tendon was sent to pathology for routine evaluation.  The tendon was very necrotic in areas.  It contained the FiberWire.  It was also quite scarred.  It did not appear to probe deep.    I do not see any new cultures.  The last cultures I have is from 2020 showing E. coli species.  She reports Achilles tendon rupture about 7 years ago.  Infection probably set and 4 years ago.  She only received 1 course of IV treatment and that was again back in 2020.    seen by Dr. Rodríguez in [facility] orthopedic in May 2022, and due to chronic infection surgical intervention, I&D was recommended.  Patient did not wish to proceed with surgery at this time due to her advanced age and prolonged healing and other concerns.  Referred to infectious disease for antibiotics    Radiology personally reviewed    MRI                                                                   IMPRESSION:  1.  Acute on chronic Achilles tendon tendinopathy with intrasubstance partial thickness tearing as well as peripherally enhancing fluid which extends from the tendinous portion of the Achilles tendon through a sinus tract to near the skin surface   worrisome for superimposed infection. No full-thickness tearing is identified.  2.  No evidence for fracture and there is no evidence for osteomyelitis or significant effusion to suggest septic arthropathy.  3.  Edema or cellulitis about the lower leg and along the dorsal aspect of the foot.  4.  No evidence for fracture.  5.  No additional tendinous or ligamentous pathology    Specimen:  Tissue - Structure of right foot (body structure)2020  Component 2 yr ago Comments   Culture  4+ Escherichia fergusonii Abnormal   Susceptibility testing done on previous culture   Culture  3+ Escherichia coli Abnormal      Culture  3+ Escherichia hermanii Abnormal              Review of Systems   The 10 point Review of Systems is negative other than noted in the HPI or here.     Past Medical History    Past  Medical History:   Diagnosis Date     Acid reflux      Anemia      Arthritis      BPPV (benign paroxysmal positional vertigo)      Cerebral artery occlusion with cerebral infarction (H)      Dyspnea on exertion      History of anesthesia complications      History of blood transfusion      Hypertension      Lung nodules      Migraine headache 9/6/2022     Mild intermittent asthma      Polymyalgia rheumatica (H)      PONV (postoperative nausea and vomiting)      Restless leg syndrome      Sjogren's syndrome (H)      Sleep apnea      TIA (transient ischemic attack) 01/01/2012       Past Surgical History   Past Surgical History:   Procedure Laterality Date     ANKLE SURGERY       BLADDER SURGERY       HYSTERECTOMY       INCISION AND DRAINAGE OF WOUND Right 8/13/2020    Procedure: INCISION AND DRAINAGE ACHILLES TENDON, RIGHT ANKLE;  Surgeon: Chan Rodríguez DPM;  Location: [facility];  Service: Podiatry     JOINT REPLACEMENT Bilateral     hips and knees     PICC  8/16/2020          RELEASE CARPAL TUNNEL       TONSILLECTOMY         Social History   Social History     Tobacco Use     Smoking status: Never Smoker     Smokeless tobacco: Never Used   Substance Use Topics     Alcohol use: Not Currently     Drug use: Never       Family History   I have reviewed this patient's family history and updated it with pertinent information if needed.  Family History   Problem Relation Age of Onset     Breast Cancer Mother        Medications   I have reviewed this patient's current medications    Allergies   Allergies   Allergen Reactions     Augmentin      Amlodipine Diarrhea     Ciprofloxacin Other (See Comments)     Tendon injury     Doxycycline Nausea and Vomiting     Duloxetine Nausea     Levofloxacin Nausea and Vomiting     Nitrofurantoin Nausea and Vomiting     Phenothiazines Other (See Comments)     Worsening of restless legs     Strawberry Hives     Sulfa (Sulfonamide Antibiotics) [Sulfa Drugs] Unknown     Walnuts  [Black [place] Pollen Allergy Skin Test] Hives     Morphine Rash       Physical Exam   Vital Signs: Temp: 97.6  F (36.4  C) Temp src: Oral BP: 131/60 Pulse: 55   Resp: 16 SpO2: 94 % O2 Device: None (Room air) Oxygen Delivery: 2 LPM  Weight: 188 lbs 0 oz    Gen. appearance nontoxic  Eyes no conjunctivitis or icterus  Neck no stiffness or neck vein distention  Heart  No edema  Lungs breathing comfortably  Abdomen soft not tender  Extremities no synovitis, trace edema; ankle wrapped  Skin  no rash or emboli  Neurologic alert oriented no focal deficits        Data   Inflammatory Markers   Recent Labs   Lab Test 08/29/22  1546 08/01/22  1629 09/10/20  1215 09/03/20  0850 08/27/20  0830 08/20/20  1245 08/17/20  0804   SED 44* 43* 48* 41* 47* 58*  --    CRP 3.18 4.40 0.6 0.6 1.2* 3.3* 4.3*        Hematology Studies   Recent Labs   Lab Test 09/07/22  0932 08/29/22  1546 08/01/22  1629 09/10/20  1215 09/03/20  0850 08/27/20  0830 08/20/20  1245   WBC  --  9.0 8.6 7.2 6.3 8.4 9.3   HGB 11.6* 12.2 12.5 12.3 12.6 13.2 12.7   MCV  --  83 83 88 88 89 88   PLT  --  332 327 302 322 299 288       Metabolic Studies   Recent Labs   Lab Test 09/06/22  1903 08/29/22  1546 08/01/22  1629 09/10/20  1215 09/03/20  0850 08/27/20  0830 08/20/20  1245 08/17/20  0804   NA  --  138 138  --   --   --   --  139   POTASSIUM  --  4.4 4.7  --   --   --   --  4.0   CHLORIDE  --  104 104  --   --   --   --  106   CO2  --  25 25  --   --   --   --  25   BUN  --  9.9 11.2 11 12 11   < > 10   CR 0.89 0.85 0.83 0.77 0.82 0.74   < > 0.79   GFRESTIMATED 62 66 67 >60 >60 >60   < > >60    < > = values in this interval not displayed.       Hepatic Studies    Recent Labs   Lab Test 08/29/22  1546 08/01/22  1629 09/10/20  1215 09/03/20  0850 08/27/20  0830 08/20/20  1245   BILITOTAL 0.6 0.6  --   --   --   --    ALKPHOS 84 87  --   --   --   --    ALBUMIN 4.0 3.8  --   --   --   --    AST 23 20 13 11 12 14   ALT 20 18  --   --   --   --        Most Recent 6  Bacteria Isolates From Any Culture (See EPIC Reports for Culture Details):No lab results found.    Urine Studies  No lab results found.    Vancomycin Levels  No lab results found.    Invalid input(s): VANCO    Hepatitis B Testing No lab results found.  Hepatitis C Testing   No results found for: HCVAB, HQTG, HCGENO, HCPCR, HQTRNA, HEPRNA  HIVTesting No lab results found.    Respiratory Virus Testing    No results found for: RS, FLUAG  COVID-19 Antibody Results, Testing for Immunity    COVID-19 Antibody Results, Testing for Immunity   No data to display.         COVID-19 PCR Results    COVID-19 PCR Results   No data to display.

## 2022-09-07 NOTE — UTILIZATION REVIEW
Admission Status; Secondary Review Determination   Under the authority of the Utilization Management Committee, the utilization review process indicated a secondary review on Joyce Brian. The review outcome is based on review of the medical records, discussions with staff, and applying clinical experience noted on the date of the review.   (x) Inpatient Status Appropriate - This patient's medical care is consistent with medical management for inpatient care and reasonable inpatient medical practice.     RATIONALE FOR DETERMINATION   88 yr old female with hx CVA, HTN, KAI, GERD, asthma, PMR, RLS, Sjogren's and migraines presented for right Achilles tendon I and D.  Concern for recurrent or chronic Achilles tendon infection.  Treated 2020 for E coli infection.  Had FiberWire removed at time of I and D.  Currently no osteomyelitis or septic arthropathy identified however concern for infection in tendon.  Bacterial/fungal/AFB cultures in process.  ID consulting and recommend ceftriaxone for now pending cultures.  Anticipating will need course of ID abx followed with oral.  Was on oral abx prior to presentation.  Ongoing medical eval/treatments in process.    At the time of admission with the information available to the attending physician more than 2 nights Hospital complex care was anticipated, based on patient risk of adverse outcome if treated as outpatient and complex care required. Inpatient admission is appropriate based on the Medicare guidelines.   The information on this document is developed by the utilization review team in order for the business office to ensure compliance. This only denotes the appropriateness of proper admission status and does not reflect the quality of care rendered.   The definitions of Inpatient Status and Observation Status used in making the determination above are those provided in the CMS Coverage Manual, Chapter 1 and Chapter 6, section 70.4.   Sincerely,   Stephanie HERNANDEZ  MD [place]  Utilization Review  Physician Advisor  [facility]

## 2022-09-07 NOTE — PLAN OF CARE
"PRIMARY DIAGNOSIS: \"GENERIC\" NURSING  OUTPATIENT/OBSERVATION GOALS TO BE MET BEFORE DISCHARGE:  1. ADLs back to baseline: No    2. Activity and level of assistance: Up with standby assistance.    3. Pain status: Improved-controlled with oral pain medications.    4. Return to near baseline physical activity: Yes     Discharge Planner Nurse   Safe discharge environment identified: No  Barriers to discharge: Yes, placement         Entered by: Aster Jauregui RN 09/07/2022    Please review provider order for any additional goals.   Nurse to notify provider when observation goals have been met and patient is ready for discharge.    VSS. Pt did not require oxygen therapy at night, 02 remained around 93% on RA. Pt had surgery on her achilles tendon for the 4th time. She stated she had mild pain but propping her foot up on two pillows helped throughout the night. Dr. Rodríguez came to her room around 0630 and stated that he will come by tomorrow morning to remove the dressing and packing as he wants it in for one more day. Verbally d/c'd continuous LR and o2 monitoring. Watch her IV as it is close to needing to be removed. Dr. Rodríguez would like to keep it for now for the IV Ancef.   "

## 2022-09-07 NOTE — PROGRESS NOTES
[facility] MEDICINE  PROGRESS NOTE     Code Status: Full Code  Procedure(s):  RIGHT ACHILLES TENDON INCISION, DRAINAGE AND DEBRIDEMENT  1 Day Post-Op  Identification/Summary:   88 year old female with a PMH of CVA, HTN, KAI, GERD, asthma, PMR, RLS, Sjogren's and migraine headaches.  Underwent right Achilles tendon I&D by Dr. Chan Rodríguez.  ID consulted.  On Ancef.  Further antibiotics per specialists.  Medically stable.     Assessment and Plan:  -Recurrent right Achilles tendinitis  -Status post right Achilles tendon I&D 9/6/2022  Further management per podiatry.  Had been on Keflex prior to admission.   Ancef ordered by podiatry.  Infectious diseases been consulted.  Further antibiotic management per their recommendations.  -Essential hypertension  -CVA history  Denies any deficits from her stroke.  Continue losartan 50 mg daily with hold parameters.  -Migraine headaches  Patient has taken Excedrin Migraine and Imitrex in the past.  No issues at this time.  Follow clinically.  -Polymyalgia rheumatica  -Restless leg syndrome  Continue home gabapentin 200 mg nightly, Mirapex 0.5 mg nightly and prednisone 2.5 mg daily.  -Sjogren's syndrome  Continue home Plaquenil 200 mg daily  -GERD  Continue home omeprazole.  -Obstructive sleep apnea  Patient does not utilize CPAP.  Follow pulse oximetry and provide supplemental oxygen as needed.  Did not require supplemental oxygen overnight.     COVID-19 PCR negative from 9/2/2022  Noted.  Standard precautions  Anticoagulation.    Lovenox 40 mg every 24 hours ordered by surgery.  Routine postoperative risk.  Fluids: Saline lock  Pain meds: Per orthopedics  Therapy: Per orthopedics   Summers:Not present  Current Diet  Orders Placed This Encounter      Regular Diet Adult    Supplements  Active Nourishment Order   Procedures     Room Service     Barriers to Discharge: IV antibiotics, ID evaluation    Disposition: To be determined    Clinically  "Significant Risk Factors Present on Admission                 # Hypertension: home medication list includes antihypertensive(s)    # Obesity: Estimated body mass index is 32.27 kg/m  as calculated from the following:    Height as of this encounter: 1.626 m (5' 4\").    Weight as of this encounter: 85.3 kg (188 lb).        Interval History/Subjective:  Patient doing well.  Pain under good control.  No chest pain.  No shortness of breath.  No nausea or vomiting.  No lightheadedness or dizziness.  No fevers.  Did not require supplemental oxygen overnight.  Questions answered to verbalized satisfaction.    Physical Exam/Objective:  Temp:  [96.8  F (36  C)-99.3  F (37.4  C)] 98.1  F (36.7  C)  Pulse:  [57-92] 57  Resp:  [16-25] 18  BP: (124-183)/(57-94) 145/67  SpO2:  [91 %-99 %] 94 %  Wt Readings from Last 4 Encounters:   09/06/22 85.3 kg (188 lb)   08/01/22 87.1 kg (192 lb)   08/16/20 84.8 kg (187 lb)     Body mass index is 32.27 kg/m .    Constitutional: awake, alert, cooperative, no apparent distress, and appears stated age  ENT: Normocephalic, without obvious abnormality, atraumatic, external ears without lesions, oral pharynx with moist mucous membranes, tonsils without erythema or exudates, gums normal and good dentition.  Respiratory: No increased work of breathing, good air exchange, clear to auscultation bilaterally, no crackles or wheezing  Cardiovascular: Normal apical impulse, regular rate and rhythm, normal S1 and S2, no S3 or S4, and no murmur noted  GI: No scars, normal bowel sounds, soft, non-distended, non-tender, no masses palpated, no hepatosplenomegally  Skin: normal skin color, texture, turgor, no redness, warmth, or swelling, and no rashes  Musculoskeletal: Limited exam to right lower extremity secondary to surgical wrap.  Good capillary refill sensation and warmth at the right toes  There is no redness, warmth, or swelling of the joints.  Motor strength is 5 out of 5 all extremities bilaterally.  " "Tone is normal. no lower extremity pitting edema present  Neurologic: Cranial nerves II-XII are grossly intact. Sensory:  Sensory intact  Neuropsychiatric: General: normal, calm and normal eye contact Level of consciousness: alert / normal Affect: normal Orientation: oriented to self, place, time and situation Memory and insight: normal, memory for past and recent events intact and thought process normal      Medications:   Personally Reviewed.  Medications       acetaminophen  975 mg Oral Q8H     enoxaparin ANTICOAGULANT  40 mg Subcutaneous Q24H     gabapentin  200 mg Oral At Bedtime     hydroxychloroquine  200 mg Oral Daily     losartan  50 mg Oral Daily     pantoprazole  40 mg Oral QAM AC     polyethylene glycol  17 g Oral Daily     pramipexole  0.5 mg Oral At Bedtime     predniSONE  2.5 mg Oral Daily     senna-docusate  1 tablet Oral BID     sodium chloride (PF)  3 mL Intracatheter Q8H       Data reviewed today: I personally reviewed all new medications, labs, imaging/diagnostics reports over the past 24 hours. Pertinent findings include:    Imaging:   Recent Results (from the past 24 hour(s))   POC US Guidance Needle Placement    Narrative    Ultrasound was performed as guidance to an anesthesia procedure.  Click   \"PACS images\" hyperlink below to view any stored images.  For specific   procedure details, view procedure note authored by anesthesia.       Labs:  POC US Guidance Needle Placement   Final Result        Recent Results (from the past 24 hour(s))   Creatinine    Collection Time: 09/06/22  7:03 PM   Result Value Ref Range    Creatinine 0.89 0.60 - 1.10 mg/dL    GFR Estimate 62 >60 mL/min/1.73m2   Glucose by meter    Collection Time: 09/07/22  7:25 AM   Result Value Ref Range    GLUCOSE BY METER POCT 133 (H) 70 - 99 mg/dL       Pending Labs:  Unresulted Labs Ordered in the Past 30 Days of this Admission     Date and Time Order Name Status Description    9/6/2022  4:38 PM Surgical Pathology Exam In " process     9/6/2022  4:35 PM Wound Aerobic Bacterial Culture Routine In process     9/6/2022  4:35 PM Anaerobic Bacterial Culture Routine In process           Isaac Paul MD  [facility]  Phone: #[phone]

## 2022-09-08 ENCOUNTER — HOME INFUSION (PRE-WILLOW HOME INFUSION) (OUTPATIENT)
Dept: PHARMACY | Facility: CLINIC | Age: 87
End: 2022-09-08

## 2022-09-08 ENCOUNTER — APPOINTMENT (OUTPATIENT)
Dept: OCCUPATIONAL THERAPY | Facility: CLINIC | Age: 87
DRG: 501 | End: 2022-09-08
Attending: PODIATRIST
Payer: COMMERCIAL

## 2022-09-08 ENCOUNTER — APPOINTMENT (OUTPATIENT)
Dept: PHYSICAL THERAPY | Facility: CLINIC | Age: 87
DRG: 501 | End: 2022-09-08
Attending: PODIATRIST
Payer: COMMERCIAL

## 2022-09-08 LAB
GLUCOSE BLDC GLUCOMTR-MCNC: 84 MG/DL (ref 70–99)
HGB BLD-MCNC: 11.8 G/DL (ref 11.7–15.7)
HOLD SPECIMEN: NORMAL
PATH REPORT.COMMENTS IMP SPEC: NORMAL
PATH REPORT.COMMENTS IMP SPEC: NORMAL
PATH REPORT.FINAL DX SPEC: NORMAL
PATH REPORT.GROSS SPEC: NORMAL
PATH REPORT.MICROSCOPIC SPEC OTHER STN: NORMAL
PATH REPORT.RELEVANT HX SPEC: NORMAL
PHOTO IMAGE: NORMAL
SARS-COV-2 RNA RESP QL NAA+PROBE: NEGATIVE

## 2022-09-08 PROCEDURE — 250N000012 HC RX MED GY IP 250 OP 636 PS 637: Performed by: FAMILY MEDICINE

## 2022-09-08 PROCEDURE — 99232 SBSQ HOSP IP/OBS MODERATE 35: CPT | Performed by: INTERNAL MEDICINE

## 2022-09-08 PROCEDURE — 250N000009 HC RX 250: Performed by: INTERNAL MEDICINE

## 2022-09-08 PROCEDURE — 120N000001 HC R&B MED SURG/OB

## 2022-09-08 PROCEDURE — 85018 HEMOGLOBIN: CPT | Performed by: PODIATRIST

## 2022-09-08 PROCEDURE — 250N000013 HC RX MED GY IP 250 OP 250 PS 637: Performed by: PODIATRIST

## 2022-09-08 PROCEDURE — 88304 TISSUE EXAM BY PATHOLOGIST: CPT | Mod: 26 | Performed by: PATHOLOGY

## 2022-09-08 PROCEDURE — 250N000011 HC RX IP 250 OP 636: Performed by: INTERNAL MEDICINE

## 2022-09-08 PROCEDURE — 97535 SELF CARE MNGMENT TRAINING: CPT | Mod: GO

## 2022-09-08 PROCEDURE — 97166 OT EVAL MOD COMPLEX 45 MIN: CPT | Mod: GO

## 2022-09-08 PROCEDURE — 999N000206 HC STATISTICAL VASC ACCESS NURSE TIME, 61+ MINUTES

## 2022-09-08 PROCEDURE — 99232 SBSQ HOSP IP/OBS MODERATE 35: CPT | Performed by: FAMILY MEDICINE

## 2022-09-08 PROCEDURE — 36415 COLL VENOUS BLD VENIPUNCTURE: CPT | Performed by: PODIATRIST

## 2022-09-08 PROCEDURE — U0003 INFECTIOUS AGENT DETECTION BY NUCLEIC ACID (DNA OR RNA); SEVERE ACUTE RESPIRATORY SYNDROME CORONAVIRUS 2 (SARS-COV-2) (CORONAVIRUS DISEASE [COVID-19]), AMPLIFIED PROBE TECHNIQUE, MAKING USE OF HIGH THROUGHPUT TECHNOLOGIES AS DESCRIBED BY CMS-2020-01-R: HCPCS | Performed by: HOSPITALIST

## 2022-09-08 PROCEDURE — 36569 INSJ PICC 5 YR+ W/O IMAGING: CPT

## 2022-09-08 PROCEDURE — 250N000011 HC RX IP 250 OP 636: Performed by: PODIATRIST

## 2022-09-08 PROCEDURE — 97116 GAIT TRAINING THERAPY: CPT | Mod: GP

## 2022-09-08 PROCEDURE — 97110 THERAPEUTIC EXERCISES: CPT | Mod: GP

## 2022-09-08 PROCEDURE — 272N000450 HC KIT 4FR POWER PICC SINGLE LUMEN

## 2022-09-08 PROCEDURE — 250N000013 HC RX MED GY IP 250 OP 250 PS 637: Performed by: FAMILY MEDICINE

## 2022-09-08 RX ORDER — AMOXICILLIN 250 MG
1 CAPSULE ORAL 2 TIMES DAILY
Refills: 0 | Status: CANCELLED | COMMUNITY
Start: 2022-09-08

## 2022-09-08 RX ORDER — LIDOCAINE 40 MG/G
CREAM TOPICAL
Status: ACTIVE | OUTPATIENT
Start: 2022-09-08 | End: 2022-09-11

## 2022-09-08 RX ORDER — ALBUTEROL SULFATE 90 UG/1
2 AEROSOL, METERED RESPIRATORY (INHALATION) EVERY 6 HOURS PRN
Status: DISCONTINUED | OUTPATIENT
Start: 2022-09-08 | End: 2022-09-15 | Stop reason: HOSPADM

## 2022-09-08 RX ADMIN — POLYETHYLENE GLYCOL 3350 17 G: 17 POWDER, FOR SOLUTION ORAL at 09:04

## 2022-09-08 RX ADMIN — ACETAMINOPHEN 975 MG: 325 TABLET, COATED ORAL at 05:46

## 2022-09-08 RX ADMIN — CEFEPIME HYDROCHLORIDE 2 G: 2 INJECTION, POWDER, FOR SOLUTION INTRAVENOUS at 15:42

## 2022-09-08 RX ADMIN — LIDOCAINE HYDROCHLORIDE 4 ML: 10 INJECTION, SOLUTION EPIDURAL; INFILTRATION; INTRACAUDAL; PERINEURAL at 14:45

## 2022-09-08 RX ADMIN — HYDROXYCHLOROQUINE SULFATE 200 MG: 200 TABLET, FILM COATED ORAL at 09:05

## 2022-09-08 RX ADMIN — ACETAMINOPHEN 975 MG: 325 TABLET, COATED ORAL at 22:35

## 2022-09-08 RX ADMIN — CEFAZOLIN SODIUM 2 G: 2 INJECTION, SOLUTION INTRAVENOUS at 05:47

## 2022-09-08 RX ADMIN — CEFAZOLIN SODIUM 2 G: 2 INJECTION, SOLUTION INTRAVENOUS at 13:17

## 2022-09-08 RX ADMIN — SENNOSIDES AND DOCUSATE SODIUM 1 TABLET: 50; 8.6 TABLET ORAL at 09:05

## 2022-09-08 RX ADMIN — ACETAMINOPHEN 975 MG: 325 TABLET, COATED ORAL at 13:18

## 2022-09-08 RX ADMIN — PREDNISONE 2.5 MG: 2.5 TABLET ORAL at 12:07

## 2022-09-08 RX ADMIN — PRAMIPEXOLE DIHYDROCHLORIDE 0.5 MG: 0.25 TABLET ORAL at 20:02

## 2022-09-08 RX ADMIN — ENOXAPARIN SODIUM 40 MG: 100 INJECTION SUBCUTANEOUS at 12:12

## 2022-09-08 RX ADMIN — GABAPENTIN 200 MG: 100 CAPSULE ORAL at 20:02

## 2022-09-08 RX ADMIN — LOSARTAN POTASSIUM 50 MG: 50 TABLET, FILM COATED ORAL at 09:05

## 2022-09-08 RX ADMIN — PANTOPRAZOLE SODIUM 40 MG: 20 TABLET, DELAYED RELEASE ORAL at 05:46

## 2022-09-08 ASSESSMENT — ACTIVITIES OF DAILY LIVING (ADL)
ADLS_ACUITY_SCORE: 40
ADLS_ACUITY_SCORE: 40
ADLS_ACUITY_SCORE: 36
ADLS_ACUITY_SCORE: 40
ADLS_ACUITY_SCORE: 40
ADLS_ACUITY_SCORE: 35
ADLS_ACUITY_SCORE: 37
ADLS_ACUITY_SCORE: 40
ADLS_ACUITY_SCORE: 35
ADLS_ACUITY_SCORE: 40
ADLS_ACUITY_SCORE: 36
ADLS_ACUITY_SCORE: 37

## 2022-09-08 NOTE — PROGRESS NOTES
"[facility] Orthopaedics Progress Note      Post-operative Day: 2 Days Post-Op    Procedure(s):  RIGHT ACHILLES TENDON INCISION, DRAINAGE AND DEBRIDEMENT      Plan: Change dressing every other day.   Anticoagulation protocol: scoanticoagulationlist2: Lovenox 40 mg daily            Pain medications:  scopainmedication: tylenol (oxycodone available but not used.)             Weight bearing status:  TTWB            Disposition:  Home tomorrow based on placement recommendations and ID coordination. Awaiting recommendations from ID regarding IV vs. Oral ABX and recommendation for placement.              Continue cares and rehabilitation     Subjective:  Joyce is doing well this morning.   Pain: minimal  Chest pain, SOB:  No  Denies lightheadedness/dizziness  Denies nausea/ vomiting  Passing flatus  voiding well    Objective:  Blood pressure (!) 171/79, pulse 55, temperature 97.4  F (36.3  C), temperature source Oral, resp. rate 16, height 1.626 m (5' 4\"), weight 93.4 kg (206 lb), SpO2 95 %.    Patient Vitals for the past 24 hrs:   BP Temp Temp src Pulse Resp SpO2 Weight   09/08/22 0834 (!) 171/79 97.4  F (36.3  C) Oral 55 16 95 % --   09/08/22 0659 -- -- -- -- -- -- 93.4 kg (206 lb)   09/08/22 0000 115/56 97.6  F (36.4  C) Oral 55 16 95 % --   09/07/22 1617 (!) 142/66 97.4  F (36.3  C) Oral 60 16 96 % --   09/07/22 1500 -- -- -- -- -- 96 % --   09/07/22 1100 (!) (P) 141/62 (P) 97.3  F (36.3  C) (P) Oral (P) 57 -- (P) 94 % --       Wt Readings from Last 4 Encounters:   09/08/22 93.4 kg (206 lb)   08/01/22 87.1 kg (192 lb)   08/16/20 84.8 kg (187 lb)         Motor function, sensation, and circulation intact   Yes  Wound status: clean, without shadowing, dry, and intact. Yes. Packing removed, sutures remained intact.   Patient's heel wound then covered with non-adhesive gauze, 4x4s, Kerlix, and ABD, and a large ACE bandage.   Calf tenderness: Bilateral  No    Pertinent Labs   Lab Results: personally reviewed.     Recent " Labs   Lab Test 09/08/22  0941 09/07/22  0932 08/29/22  1546 08/01/22  1629 09/10/20  1215 08/17/20  0804 08/17/20  0804   HGB 11.8 11.6* 12.2 12.5 12.3   < >  --    HCT  --   --  38.8 40.0 39.7   < >  --    MCV  --   --  83 83 88   < >  --    PLT  --   --  332 327 302   < >  --    NA  --   --  138 138  --   --  139   CRP  --   --  3.18 4.40 0.6   < > 4.3*    < > = values in this interval not displayed.       Report completed by:  Mina Blas NP  Date: 9/8/2022

## 2022-09-08 NOTE — PROGRESS NOTES
Mina GUO of Dr. Rodríguez Team, in Patient's room to remove packing from Patient's right heel. -Aleja NOBLE RN

## 2022-09-08 NOTE — PLAN OF CARE
Problem: Pain Acute  Goal: Acceptable Pain Control and Functional Ability  Intervention: Prevent or Manage Pain  Recent Flowsheet Documentation  Taken 9/8/2022 0000 by Kathy Abdi RN  Medication Review/Management: medications reviewed   Goal Outcome Evaluation:    Pt A&O.  VSS.  Denies nausea and SOB.  Patient's pain controlled with scheduled tylenol.  Dressing to RLE is CDI, orthotic shoe in place.  Toe touch weight bearing status to RLE.   Swelling to LUE from infiltrated IV is reducing, used ice and elevated site. Up A1 pivot to the commode.

## 2022-09-08 NOTE — PROGRESS NOTES
[facility]    Referral received  from Reanna Arroyo CM, for IV antibiotics.    Benefits verified.  Pt does not have coverage for IV antibiotics in the home under her [employer] Medicare plan.  She would be self-pay.  Based on Cefazolin 2g IV q 8 hours, the total cost is $31.74 for drug and supplies/day.    Nursing is only covered if the patient is homebound.  If not, the cost is $90 per visit if [facility] bills for it.  She may have coverage in a TCU or Infusion Center.    Writer will speak with pt to review benefits, home infusion and to offer choice of agency/home infusion provider.    Thank you for the referral.    Sarika Rodriguez, RN, BSN  [facility] Liaison  [phone] (Mon through Fri, 8:00 am-5:00 pm)  [phone] (Office)

## 2022-09-08 NOTE — PROGRESS NOTES
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     PLAN  IV cefepime  Growing NLFGNR  I added fungal and AFB cultures: they can do fungal but not AFB  Follow cultures and adjust  Likely combination of IV then oral antibiotics ourse:PICC ordered  TCU  Do IV x 10-14 days then PO   LAbs    Nat Berry MD  [facility]    ______________________________________________________________________    Interval History   Feels ok  Dressing changed    All 12 systems reviewed, negative except for the above.      Physical Exam   Vital Signs: Temp: 97.4  F (36.3  C) Temp src: Oral BP: (!) 171/79 Pulse: 55   Resp: 16 SpO2: 95 % O2 Device: None (Room air)    Weight: 206 lbs 0 oz  Gen. appearance nontoxic  Eyes no conjunctivitis or icterus  Neck no stiffness or neck vein distention  Heart  No edema  Lungs breathing comfortably  Abdomen soft not tender  Extremities no synovitis, trace edema; ankle wrapped>>see ortho note  Skin  no rash or emboli  Neurologic alert oriented no focal deficits    Data   Results for orders placed or performed during the hospital encounter of 09/06/22 (from the past 24 hour(s))   Asymptomatic COVID-19 Virus (Coronavirus) by PCR Nose    Specimen: Nose; Swab   Result Value Ref Range    SARS CoV2 PCR Negative Negative    Narrative    Testing was performed using the Xpert Xpress  SARS-CoV-2 Assay on the   Cepheid Gene-Xpert Instrument Systems. Additional information about   this Emergency Use Authorization (EUA) assay can be found via the Lab   Guide. This test should be ordered for the detection of SARS-CoV-2 in   individuals who meet SARS-CoV-2 clinical and/or epidemiological   criteria. Test performance is unknown in asymptomatic patients. This   test is for in vitro diagnostic use under the FDA EUA for   laboratories certified under CLIA to perform high complexity testing.   This test has not been FDA cleared or approved. A negative result   does not rule out the presence of PCR inhibitors in the specimen or   target RNA in concentration below the limit of detection for the   assay. The possibility of a false negative should be considered if   the patient's recent exposure or clinical presentation suggests   COVID-19. This test was validated by the [facility]. This laboratory is certified under the Clinical Laboratory Improvement Amendments of 1988 (CLIA-88) as qualified to perform high complexity laboratory testing.     Glucose by meter   Result Value Ref Range    GLUCOSE BY METER POCT 84 70 - 99 mg/dL   Hemoglobin   Result Value Ref Range    Hemoglobin 11.8 11.7 - 15.7 g/dL   Extra Tube    Narrative    The following orders were created for panel order Extra Tube.  Procedure                               Abnormality         Status                     ---------                               -----------         ------                     Extra Green Top (Lithium...[[id number]]                      Final result                 Please view results for these tests on the individual orders.   Extra Green Top (Lithium Heparin) Tube   Result Value Ref Range    Hold Specimen [facility]

## 2022-09-08 NOTE — PLAN OF CARE
Goal Outcome Evaluation:  Problem: Plan of Care - These are the overarching goals to be used throughout the patient stay.    Goal: Plan of Care Review/Shift Note  Description: The Plan of Care Review/Shift note should be completed every shift.  The Outcome Evaluation is a brief statement about your assessment that the patient is improving, declining, or no change.  This information will be displayed automatically on your shift note.  Outcome: Ongoing, Progressing  Pt c/o back pain from sitting. No c/o of pain in RLE. Discussed topical creams for pain management if needed but controlled for the evening with ice, gabapentin and tylenol. IV in left arm infiltrated with cefalazoin; replaced IV with right AC.

## 2022-09-08 NOTE — PLAN OF CARE
Problem: Plan of Care - These are the overarching goals to be used   Outcome: Ongoing, Progressing  Goal: Absence of Hospital-Acquired Illness or Injury  Outcome: Ongoing, Progressing  Intervention: Identify and Manage Fall Risk  Recent Flowsheet Documentation  Taken 9/8/2022 0902 by Aleja Callejas RN  Safety Promotion/Fall Prevention:    assistive device/personal items within reach    mobility aid in reach    nonskid shoes/slippers when out of bed    safety round/check completed  Intervention: Prevent Skin Injury  Recent Flowsheet Documentation  Taken 9/8/2022 0902 by Aleja Callejas RN  Body Position: weight shifting  Intervention: Prevent and Manage VTE (Venous Thromboembolism) Risk  Recent Flowsheet Documentation  Taken 9/8/2022 0902 by Aleja Callejas RN  Activity Management: up in chair  Goal: Optimal Comfort and Wellbeing  Outcome: Ongoing, Progressing  Intervention: Monitor Pain and Promote Comfort  Recent Flowsheet Documentation  Taken 9/8/2022 0902 by Aleja Callejas RN  Pain Management Interventions: repositioned  Goal: Readiness for Transition of Care  Outcome: Ongoing, Progressing     Problem: Pain Acute  Goal: Acceptable Pain Control and Functional Ability  Outcome: Ongoing, Progressing  Intervention: Develop Pain Management Plan  Recent Flowsheet Documentation  Taken 9/8/2022 0902 by Aleja Callejas RN  Pain Management Interventions: repositioned  Intervention: Prevent or Manage Pain  Recent Flowsheet Documentation  Taken 9/8/2022 0902 by Aleja Callejas RN  Medication Review/Management: medications reviewed     Goal Outcome Evaluation:  Alert and orienated x4, denied pain, up on the chair independently eating breakfast noted ate 100% with good appetite, watching TV care, legs elevated with a pillow, right leg stabilized with off loading boot, acewrap in placed until remove by Dr. Rodríguez care team this morning. Call light, phone, and tray table with in Patient's reach. Patient denied any  immediate needs at this time. Will continue to monitor. -Aleja NOBLE RN

## 2022-09-08 NOTE — PROGRESS NOTES
09/08/22 1145   Quick Adds   Type of Visit Initial Occupational Therapy Evaluation   Living Environment   People in Home alone   Current Living Arrangements independent living facility  (sr apt)   Self-Care   Usual Activity Tolerance good   Current Activity Tolerance fair   Equipment Currently Used at Home walker, rolling  (4WW)   Fall history within last six months yes   Number of times patient has fallen within last six months 2   Instrumental Activities of Daily Living (IADL)   IADL Comments indep. ADLs , uses 4WW   General Information   Onset of Illness/Injury or Date of Surgery 09/06/22   Patient/Family Therapy Goal Statement (OT) TCU possibly   Existing Precautions/Restrictions fall;weight bearing   Right Lower Extremity (Weight-bearing Status) toe touch weight-bearing (TTWB)   Cognitive Status Examination   Affect/Mental Status (Cognitive) WFL   Follows Commands WFL   Visual Perception   Visual Impairment/Limitations corrective lenses full-time   Range of Motion Comprehensive   General Range of Motion no range of motion deficits identified   Strength Comprehensive (MMT)   General Manual Muscle Testing (MMT) Assessment no strength deficits identified   Coordination   Upper Extremity Coordination No deficits were identified   Bed Mobility   Bed Mobility supine-sit;sit-supine   Supine-Sit [place] (Bed Mobility) supervision   Sit-Supine [place] (Bed Mobility) supervision   Transfers   Transfers bed-chair transfer   Transfer Skill: Bed to Chair/Chair to Bed   Bed-Chair [place] (Transfers) minimum assist (75% patient effort)   Assistive Device (Bed-Chair Transfers) rolling walker   Transfer Comments TTWB   Balance   Balance Assessment standing balance: dynamic   Balance Comments fair   Lower Body Dressing Assessment/Training   [place] Level (Lower Body Dressing) other (see comments)   Comment, (Lower Body Dressing) donned pants partially w/ mod A, donned/doffed L sock  SBA. max A w/ post op  shoe   Clinical Impression   Criteria for Skilled Therapeutic Interventions Met (OT) Yes, treatment indicated   OT Diagnosis decreased ADLs   OT Problem List-Impairments impacting ADL balance;mobility;pain;post-surgical precautions   Assessment of Occupational Performance 1-3 Performance Deficits   Identified Performance Deficits trsfs, dory,   Planned Therapy Interventions (OT) ADL retraining;transfer training   Clinical Decision Making Complexity (OT) moderate complexity   Anticipated Equipment Needs Upon Discharge (OT)   (cont. to assess)   Risk & Benefits of therapy have been explained evaluation/treatment results reviewed;care plan/treatment goals reviewed;risks/benefits reviewed;current/potential barriers reviewed;patient   OT Discharge Planning   OT Discharge Recommendation (DC Rec) Transitional Care Facility;home with home care occupational therapy   OT Rationale for DC Rec currently Ax1 for TTWB w/ ADLs and mobility, pt lives alone and does not have A available, if d/c home would rec. 24 hr assist w/ ADLs and mobility, fall risk, diff. w/ maintaining TTWB   OT Goals   Therapy Frequency (OT) Daily   OT Predicted Duration/Target Date for Goal Attainment 09/13/22,   Evaluation time;8 ,minutes   OT Goals Lower Body Dressing;Transfers   OT: Lower Body Dressing using adaptive equipment;Minimal assist   OT: Transfer Minimal assist;with assistive device

## 2022-09-08 NOTE — PROGRESS NOTES
[facility] MEDICINE  PROGRESS NOTE     Code Status: Full Code  Procedure(s):  RIGHT ACHILLES TENDON INCISION, DRAINAGE AND DEBRIDEMENT  2 Days Post-Op  Identification/Summary:   88 year old female with a PMH of CVA, HTN, KAI, GERD, asthma, PMR, RLS, Sjogren's and migraine headaches.  Underwent right Achilles tendon I&D by Dr. Chan Rodríguez.  ID consulted.  On Ancef.  Further antibiotics per specialists.  Medically stable.     Assessment and Plan:  -Recurrent right Achilles tendinitis  -Status post right Achilles tendon I&D 9/6/2022  Further management per podiatry.  Had been on Keflex prior to admission.   Ancef ordered by podiatry.  Infectious diseases been consulted.  Further antibiotic management per their recommendations.  -Essential hypertension  -CVA history  Denies any deficits from her stroke.  Continue losartan 50 mg daily with hold parameters.  -Migraine headaches  Patient has taken Excedrin Migraine and Imitrex in the past.  No issues at this time.  Follow clinically.  -Polymyalgia rheumatica  -Restless leg syndrome  Continue home gabapentin 200 mg nightly, Mirapex 0.5 mg nightly and prednisone 2.5 mg daily.  -Sjogren's syndrome  Continue home Plaquenil 200 mg daily  -GERD  Continue home omeprazole.  -Obstructive sleep apnea  Patient does not utilize CPAP.  Follow pulse oximetry and provide supplemental oxygen as needed.  Did not require supplemental oxygen overnight.  -Exercise-induced asthma  Patient noting some shortness of breath this morning.  No wheezing noted on exam.  Has used inhaler in the past but does not have one currently.  Order incentive spirometry  Order albuterol MDI to be used as needed.  RT consulted to help with technique.     COVID-19 PCR negative from 9/2/2022  Noted.  Standard precautions  Anticoagulation.    Lovenox 40 mg every 24 hours ordered by surgery.  Routine postoperative risk.  Fluids: Saline lock  Pain meds: Per orthopedics  Therapy: Per  "orthopedics   Summers:Not present  Current Diet  Orders Placed This Encounter      Regular Diet Adult    Supplements  Active Nourishment Order   Procedures     Room Service     Barriers to Discharge: IV antibiotics    Disposition: To be determined    Clinically Significant Risk Factors Present on Admission               # Obesity: Estimated body mass index is 35.36 kg/m  as calculated from the following:    Height as of this encounter: 1.626 m (5' 4\").    Weight as of this encounter: 93.4 kg (206 lb).        Interval History/Subjective:  Patient feeling a little short of breath this morning.  Notes that she has a history of asthma but has not used an inhaler for many years.  No chest pain.  No nausea or vomiting.  Not hypoxic.  Right Achilles pain under reasonable control.  Working with therapy.  Questions answered to verbalized satisfaction.    Physical Exam/Objective:  Temp:  [97.3  F (36.3  C)-97.6  F (36.4  C)] 97.4  F (36.3  C)  Pulse:  [55-60] 55  Resp:  [16] 16  BP: (115-171)/(56-79) 171/79  SpO2:  [94 %-96 %] 95 %  Wt Readings from Last 4 Encounters:   09/08/22 93.4 kg (206 lb)   08/01/22 87.1 kg (192 lb)   08/16/20 84.8 kg (187 lb)     Body mass index is 35.36 kg/m .    Constitutional: awake, alert, cooperative, no apparent distress, and appears stated age  ENT: Normocephalic, without obvious abnormality, atraumatic, external ears without lesions, oral pharynx with moist mucous membranes, tonsils without erythema or exudates, gums normal and good dentition.  Respiratory: Decreased air movement but no rhonchi rales nor wheezing.  Cardiovascular: Normal apical impulse, regular rate and rhythm, normal S1 and S2, no S3 or S4, and no murmur noted  GI: No scars, normal bowel sounds, soft, non-distended, non-tender, no masses palpated, no hepatosplenomegally  Skin: normal skin color, texture, turgor, no redness, warmth, or swelling, and no rashes  Musculoskeletal: Right lower extremity surgical dressing/splint left " intact otherwise  There is no redness, warmth, or swelling of the joints.  Motor strength is 5 out of 5 all extremities bilaterally.  Tone is normal. no lower extremity pitting edema present  Neurologic: Cranial nerves II-XII are grossly intact. Sensory:  Sensory intact  Neuropsychiatric: General: normal, calm and normal eye contact Level of consciousness: alert / normal Affect: normal Orientation: oriented to self, place, time and situation Memory and insight: normal, memory for past and recent events intact and thought process normal      Medications:   Personally Reviewed.  Medications       acetaminophen  975 mg Oral Q8H     ceFAZolin  2 g Intravenous Q8H     enoxaparin ANTICOAGULANT  40 mg Subcutaneous Q24H     gabapentin  200 mg Oral At Bedtime     hydroxychloroquine  200 mg Oral Daily     losartan  50 mg Oral Daily     pantoprazole  40 mg Oral QAM AC     polyethylene glycol  17 g Oral Daily     pramipexole  0.5 mg Oral At Bedtime     predniSONE  2.5 mg Oral Daily     senna-docusate  1 tablet Oral BID     sodium chloride (PF)  3 mL Intracatheter Q8H       Data reviewed today: I personally reviewed all new medications, labs, imaging/diagnostics reports over the past 24 hours. Pertinent findings include:    Imaging:   No results found for this or any previous visit (from the past 24 hour(s)).    Labs:  POC US Guidance Needle Placement   Final Result        Recent Results (from the past 24 hour(s))   Asymptomatic COVID-19 Virus (Coronavirus) by PCR Nose    Collection Time: 09/08/22  2:16 AM    Specimen: Nose; Swab   Result Value Ref Range    SARS CoV2 PCR Negative Negative   Glucose by meter    Collection Time: 09/08/22  5:51 AM   Result Value Ref Range    GLUCOSE BY METER POCT 84 70 - 99 mg/dL   Hemoglobin    Collection Time: 09/08/22  9:41 AM   Result Value Ref Range    Hemoglobin 11.8 11.7 - 15.7 g/dL       Pending Labs:  Unresulted Labs Ordered in the Past 30 Days of this Admission     Date and Time Order  Name Status Description    9/7/2022 10:52 AM Fungal or Yeast Culture Routine In process     9/6/2022  4:38 PM Surgical Pathology Exam In process     9/6/2022  4:35 PM Wound Aerobic Bacterial Culture Routine Preliminary     9/6/2022  4:35 PM Anaerobic Bacterial Culture Routine Preliminary             Isaac Paul MD  [facility]  Phone: #[phone]

## 2022-09-08 NOTE — PROCEDURES
"PICC Line Insertion Procedure Note  Pt. Name: Joyce Brian  MRN:  [id number]        Procedure: Insertion of a  single Lumen  4 fr  Bard SOLO (valved) Power PICC, Lot number [id number]    Indications: antibiotics    Contraindications : n/a    Procedure Details   Patient identified with 2 identifiers and \"Time Out\" conducted.  .     Central line insertion bundle followed: hand hygeine performed prior to procedure, site cleansed with cholraprep, hat, mask, sterile gloves,sterile gown worn, patient draped with maximum barrier head to toe drape, sterile field maintained.    The vein was assessed and found to be compressible and of adequate size. 4 ml 1% Lidocaine administered sq to the insertion site. A 4 Fr PICC was inserted into the basilic vein of the right arm with ultrasound guidance. 1 attempt(s) required to access vein.   Catheter threaded without difficulty. Good blood return noted.    Modified Seldinger Technique used for insertion.    The 8 sharps that are included in the PICC insertion kit were accounted for and disposed of in the sharps container prior to breakdown of the sterile field.    Catheter secured with Statlock, biopatch and Tegaderm dressing applied.    Findings:  Total catheter length  37 cm, with 0 cm exposed. Mid upper arm circumference is 33 cm. Catheter was flushed with 20 cc NS. Patient  tolerated procedure well.    Tip placement verified by 3CG. Tip placement in the SVC.    CLABSI prevention brochure left at bedside.    Patient's primary RN notified PICC is ready for use.    Comments:        Mark Stevens, DOUGN, RN  [facility]  "

## 2022-09-08 NOTE — CONSULTS
"Care Management Follow Up    Length of Stay (days): 2    Expected Discharge Date: 09/09/2022     Concerns to be Addressed:     Discharge planning, Antibiotic plan  Patient plan of care discussed at interdisciplinary rounds: Yes    Anticipated Discharge Disposition:  Transitional care?     Anticipated Discharge Services:  Nursing PT and OT  Anticipated Discharge DME:  Pt has a 4WW.     Patient/family educated on Medicare website which has current facility and service quality ratings:    Education Provided on the Discharge Plan: yes    Patient/Family in Agreement with the Plan:  Yes, patient is agreeable to send TCU referrals    Referrals Placed by CM/SW:    Private pay costs discussed: insurance costs out of pocket expenses    Additional Information:  CM Chart reviewed. SW spoke to patient in her room today to introduce self, CM role and review discharge planning .  Patient lives alone in a one level home. She has adult children and they have recently had COVID so she has not seen them in a few weeks. Patient has her personal 4 wheeled walker in her hospital room and states she has attempted the knee scooter with PT.  Pt is toe toe touch weight bearing. PT states \"pt will need TCU to improve functional mobility prior to d/c home.\"    Patient states she would like referral sent to [facility] TCU as she has been there before.  Care management following for discharge planning.      Josephine Cotton, KAREN        "

## 2022-09-08 NOTE — PROGRESS NOTES
Pt does not have coverage for iv abx in the home with their [employer] Medicare plan. Pt would be self-pay. Drug would be billed to the part D and supplies will be self-pay.   Based on Cefazolin 2g q8h, total cost is $31.74 for drug and supplies per day.     Nursing is only covered if patient is homebound if not cost is $90.00 per visit if [facility] bills for it. Patient should have coverage in a TCU or infusion center. Let us know how patient would like to proceed.      Please contact Intake with any questions, [phone] or In Basket pool,  Home Infusion ([phone]).

## 2022-09-09 ENCOUNTER — APPOINTMENT (OUTPATIENT)
Dept: ULTRASOUND IMAGING | Facility: CLINIC | Age: 87
DRG: 501 | End: 2022-09-09
Attending: NURSE PRACTITIONER
Payer: COMMERCIAL

## 2022-09-09 ENCOUNTER — APPOINTMENT (OUTPATIENT)
Dept: OCCUPATIONAL THERAPY | Facility: CLINIC | Age: 87
DRG: 501 | End: 2022-09-09
Attending: PODIATRIST
Payer: COMMERCIAL

## 2022-09-09 LAB
BACTERIA WND CULT: ABNORMAL
BACTERIA WND CULT: ABNORMAL
C DIFF TOX B STL QL: NEGATIVE
C REACTIVE PROTEIN LHE: 2.8 MG/DL (ref 0–?)
PLATELET # BLD AUTO: 279 10E3/UL (ref 150–450)

## 2022-09-09 PROCEDURE — 85049 AUTOMATED PLATELET COUNT: CPT | Performed by: PODIATRIST

## 2022-09-09 PROCEDURE — 99232 SBSQ HOSP IP/OBS MODERATE 35: CPT | Performed by: FAMILY MEDICINE

## 2022-09-09 PROCEDURE — 97535 SELF CARE MNGMENT TRAINING: CPT | Mod: GO

## 2022-09-09 PROCEDURE — 250N000012 HC RX MED GY IP 250 OP 636 PS 637: Performed by: FAMILY MEDICINE

## 2022-09-09 PROCEDURE — 250N000013 HC RX MED GY IP 250 OP 250 PS 637: Performed by: PODIATRIST

## 2022-09-09 PROCEDURE — 87493 C DIFF AMPLIFIED PROBE: CPT | Performed by: NURSE PRACTITIONER

## 2022-09-09 PROCEDURE — 99232 SBSQ HOSP IP/OBS MODERATE 35: CPT | Performed by: INTERNAL MEDICINE

## 2022-09-09 PROCEDURE — 250N000013 HC RX MED GY IP 250 OP 250 PS 637: Performed by: FAMILY MEDICINE

## 2022-09-09 PROCEDURE — 250N000011 HC RX IP 250 OP 636: Performed by: INTERNAL MEDICINE

## 2022-09-09 PROCEDURE — 93971 EXTREMITY STUDY: CPT | Mod: RT

## 2022-09-09 PROCEDURE — 120N000001 HC R&B MED SURG/OB

## 2022-09-09 PROCEDURE — 86140 C-REACTIVE PROTEIN: CPT | Performed by: INTERNAL MEDICINE

## 2022-09-09 PROCEDURE — 250N000011 HC RX IP 250 OP 636: Performed by: PODIATRIST

## 2022-09-09 RX ORDER — CEFTRIAXONE 2 G/1
2 INJECTION, POWDER, FOR SOLUTION INTRAMUSCULAR; INTRAVENOUS EVERY 24 HOURS
Status: DISCONTINUED | OUTPATIENT
Start: 2022-09-09 | End: 2022-09-15 | Stop reason: HOSPADM

## 2022-09-09 RX ORDER — ACETAMINOPHEN 325 MG/1
975 TABLET ORAL EVERY 8 HOURS
Qty: 90 TABLET | Refills: 0 | Status: SHIPPED | OUTPATIENT
Start: 2022-09-09

## 2022-09-09 RX ORDER — AMOXICILLIN 250 MG
1 CAPSULE ORAL 2 TIMES DAILY PRN
Status: DISCONTINUED | OUTPATIENT
Start: 2022-09-09 | End: 2022-09-15 | Stop reason: HOSPADM

## 2022-09-09 RX ORDER — POLYETHYLENE GLYCOL 3350 17 G/17G
17 POWDER, FOR SOLUTION ORAL DAILY PRN
Status: DISCONTINUED | OUTPATIENT
Start: 2022-09-09 | End: 2022-09-15 | Stop reason: HOSPADM

## 2022-09-09 RX ADMIN — CEFEPIME HYDROCHLORIDE 2 G: 2 INJECTION, POWDER, FOR SOLUTION INTRAVENOUS at 02:56

## 2022-09-09 RX ADMIN — ENOXAPARIN SODIUM 40 MG: 100 INJECTION SUBCUTANEOUS at 11:50

## 2022-09-09 RX ADMIN — CEFTRIAXONE SODIUM 2 G: 2 INJECTION, POWDER, FOR SOLUTION INTRAMUSCULAR; INTRAVENOUS at 13:27

## 2022-09-09 RX ADMIN — PANTOPRAZOLE SODIUM 40 MG: 20 TABLET, DELAYED RELEASE ORAL at 06:30

## 2022-09-09 RX ADMIN — ACETAMINOPHEN 975 MG: 325 TABLET, COATED ORAL at 13:28

## 2022-09-09 RX ADMIN — LOSARTAN POTASSIUM 50 MG: 50 TABLET, FILM COATED ORAL at 09:32

## 2022-09-09 RX ADMIN — HYDROXYCHLOROQUINE SULFATE 200 MG: 200 TABLET, FILM COATED ORAL at 09:31

## 2022-09-09 RX ADMIN — ALBUTEROL SULFATE 2 PUFF: 90 AEROSOL, METERED RESPIRATORY (INHALATION) at 11:50

## 2022-09-09 RX ADMIN — GABAPENTIN 200 MG: 100 CAPSULE ORAL at 21:37

## 2022-09-09 RX ADMIN — PREDNISONE 2.5 MG: 2.5 TABLET ORAL at 09:33

## 2022-09-09 RX ADMIN — ACETAMINOPHEN 975 MG: 325 TABLET, COATED ORAL at 06:30

## 2022-09-09 RX ADMIN — PRAMIPEXOLE DIHYDROCHLORIDE 0.5 MG: 0.25 TABLET ORAL at 21:36

## 2022-09-09 ASSESSMENT — ACTIVITIES OF DAILY LIVING (ADL)
ADLS_ACUITY_SCORE: 40

## 2022-09-09 NOTE — PROGRESS NOTES
Care Management Follow Up    Length of Stay (days): 3    Expected Discharge Date: 09/09/2022     Concerns to be Addressed:       Patient plan of care discussed at interdisciplinary rounds: Yes    Anticipated Discharge Disposition:       Anticipated Discharge Services:    Anticipated Discharge DME:      Patient/family educated on Medicare website which has current facility and service quality ratings:    Education Provided on the Discharge Plan:    Patient/Family in Agreement with the Plan:      Referrals Placed by CM/SW:    Private pay costs discussed: Not applicable    Additional Information:  Following pt regarding TCU.  Spoke to admissions at [facility] beds til next week.  Updated pt and discussed needing more TCU options.  Additional referrals made.      IRMA Veronica

## 2022-09-09 NOTE — PROGRESS NOTES
[facility] MEDICINE  PROGRESS NOTE     Code Status: Full Code  Procedure(s):  RIGHT ACHILLES TENDON INCISION, DRAINAGE AND DEBRIDEMENT  3 Days Post-Op  Identification/Summary:   88 year old female with a PMH of CVA, HTN, KAI, GERD, asthma, PMR, RLS, Sjogren's and migraine headaches.  Underwent right Achilles tendon I&D by Dr. Chan Rodríguez.  ID consulted.  On Ancef.  Surgical culture shows Citrobacter.  PICC line placed.  Having some diarrhea.  Otherwise medically stable.     Assessment and Plan:  -Recurrent right Achilles tendinitis  -Status post right Achilles tendon I&D 9/6/2022  Further management per podiatry.  Had been on Keflex prior to admission.   Ancef ordered by podiatry.  Infectious diseases been consulted.    PICC line placed.  Anticipate approximately 2 weeks of IV antibiotics and then transition to orals.  Further management per ID.  PT recommending TCU.  -Diarrhea  Having some diarrhea.  Stool softeners changed to as needed.  -Essential hypertension  -CVA history  Denies any deficits from her stroke.  Continue losartan 50 mg daily with hold parameters.  -Migraine headaches  Patient has taken Excedrin Migraine and Imitrex in the past.  No issues at this time.  Follow clinically.  -Polymyalgia rheumatica  -Restless leg syndrome  Continue home gabapentin 200 mg nightly, Mirapex 0.5 mg nightly and prednisone 2.5 mg daily.  -Sjogren's syndrome  Continue home Plaquenil 200 mg daily  -GERD  Continue home omeprazole.  -Obstructive sleep apnea  Patient does not utilize CPAP.  Follow pulse oximetry and provide supplemental oxygen as needed.  Did not require supplemental oxygen overnight.  -Exercise-induced asthma  9/8 had some shortness of breath.  Has used inhaler in the past but does not have one currently.  Responded well to incentive spirometry.  Albuterol MDI available to be used as needed.  RT consulted to help with technique.     COVID-19 PCR negative from  "9/2/2022  Noted.  Standard precautions  Anticoagulation.    Lovenox 40 mg every 24 hours ordered by surgery.  Routine postoperative risk.  Fluids: Saline lock  Pain meds: Per orthopedics  Therapy: PT recommending TCU  Summers:Not present  Current Diet  Orders Placed This Encounter      Regular Diet Adult    Supplements  Active Nourishment Order   Procedures     Room Service     Barriers to Discharge: TCU placement    Disposition: Medically stable for discharge from our standpoint    Clinically Significant Risk Factors Present on Admission               # Obesity: Estimated body mass index is 34.42 kg/m  as calculated from the following:    Height as of this encounter: 1.626 m (5' 4\").    Weight as of this encounter: 90.9 kg (200 lb 8 oz).        Interval History/Subjective:  Patient doing well.  Pain under good control.  No chest pain.  No shortness of breath.  No nausea or vomiting.  Has been having some diarrhea but tolerating regular diet without difficulty.  Hoping she can go to TCU soon.  PICC line placed without incident.  Questions answered to verbalized satisfaction.    Physical Exam/Objective:  Temp:  [97.9  F (36.6  C)-98.3  F (36.8  C)] 98.3  F (36.8  C)  Pulse:  [67-87] 87  Resp:  [16-17] 16  BP: (147-176)/(70-72) 174/70  SpO2:  [94 %-96 %] 96 %  Wt Readings from Last 4 Encounters:   09/09/22 90.9 kg (200 lb 8 oz)   08/01/22 87.1 kg (192 lb)   08/16/20 84.8 kg (187 lb)     Body mass index is 34.42 kg/m .    Constitutional: awake, alert, cooperative, no apparent distress, and appears stated age  ENT: Normocephalic, without obvious abnormality, atraumatic, external ears without lesions, oral pharynx with moist mucous membranes, tonsils without erythema or exudates, gums normal and good dentition.  Respiratory: No increased work of breathing, good air exchange, clear to auscultation bilaterally, no crackles or wheezing  Cardiovascular: Normal apical impulse, regular rate and rhythm, normal S1 and S2, no S3 or " S4, and no murmur noted  GI: No scars, very active bowel sounds, soft, non-distended, non-tender, no masses palpated, no hepatosplenomegally  Skin: normal skin color, texture, turgor, no redness, warmth, or swelling, and no rashes  Musculoskeletal: Limited exam secondary to right lower extremity splint otherwise there is no redness, warmth, or swelling of the joints.  Motor strength is 5 out of 5 all extremities bilaterally.  Tone is normal. no lower extremity pitting edema present  Neurologic: Cranial nerves II-XII are grossly intact. Sensory:  Sensory intact  Neuropsychiatric: General: normal, calm and normal eye contact Level of consciousness: alert / normal Affect: normal Orientation: oriented to self, place, time and situation Memory and insight: normal, memory for past and recent events intact and thought process normal      Medications:   Personally Reviewed.  Medications       acetaminophen  975 mg Oral Q8H     ceFEPIme  2 g Intravenous Q12H     enoxaparin ANTICOAGULANT  40 mg Subcutaneous Q24H     gabapentin  200 mg Oral At Bedtime     hydroxychloroquine  200 mg Oral Daily     losartan  50 mg Oral Daily     pantoprazole  40 mg Oral QAM AC     polyethylene glycol  17 g Oral Daily     pramipexole  0.5 mg Oral At Bedtime     predniSONE  2.5 mg Oral Daily     senna-docusate  1 tablet Oral BID     sodium chloride (PF)  3 mL Intracatheter Q8H       Data reviewed today: I personally reviewed all new medications, labs, imaging/diagnostics reports over the past 24 hours. Pertinent findings include:    Imaging:   No results found for this or any previous visit (from the past 24 hour(s)).    Labs:  POC US Guidance Needle Placement   Final Result        Recent Results (from the past 24 hour(s))   Hemoglobin    Collection Time: 09/08/22  9:41 AM   Result Value Ref Range    Hemoglobin 11.8 11.7 - 15.7 g/dL   Extra Green Top (Lithium Heparin) Tube    Collection Time: 09/08/22  9:41 AM   Result Value Ref Range    Hold  Specimen JIC    Platelet count    Collection Time: 09/09/22  6:19 AM   Result Value Ref Range    Platelet Count 279 150 - 450 10e3/uL   CRP inflammation    Collection Time: 09/09/22  6:19 AM   Result Value Ref Range    CRP 2.8 (H) 0.0 - <0.8 mg/dL       Pending Labs:  Unresulted Labs Ordered in the Past 30 Days of this Admission     Date and Time Order Name Status Description    9/7/2022 10:52 AM Fungal or Yeast Culture Routine Preliminary     9/6/2022  4:35 PM Wound Aerobic Bacterial Culture Routine Preliminary     9/6/2022  4:35 PM Anaerobic Bacterial Culture Routine Preliminary             Isaac Paul MD  [facility]  Phone: #[phone]

## 2022-09-09 NOTE — PROGRESS NOTES
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     PLAN  IV ceftriaxone till 9/19, then PO omnicef x 2 weks  Avoid quinolones  I added fungal and AFB cultures: they can do fungal but not AFB  Orderes in  TCU    Will sign off, please call with questions      Nat Berry MD  [facility]    ______________________________________________________________________    Interval History   Feels ok  Dressing changed    All 12 systems reviewed, negative except for the above.      Physical Exam   Vital Signs: Temp: 98.3  F (36.8  C) Temp src: Oral BP: (!) 174/70 (reported to the RN) Pulse: 87   Resp: 16 SpO2: 96 % O2 Device: None (Room air)    Weight: 200 lbs 8 oz  Gen. appearance nontoxic  Eyes no conjunctivitis or icterus  Neck no stiffness or neck vein distention  Heart  No edema  Lungs breathing comfortably  Abdomen soft not tender  Extremities no synovitis, trace edema; ankle wrapped>>see ortho note  Skin  no rash or emboli  Neurologic alert oriented no focal deficits    Data   Results for orders placed or performed during the hospital encounter of 09/06/22 (from the past 24 hour(s))   Platelet count   Result Value Ref Range    Platelet Count 279 150 - 450 10e3/uL   CRP inflammation   Result Value Ref Range    CRP 2.8 (H) 0.0 - <0.8 mg/dL

## 2022-09-09 NOTE — PLAN OF CARE
Problem: Plan of Care - These are the overarching goals to be used throughout the patient stay.    Goal: Optimal Comfort and Wellbeing  Outcome: Ongoing, Progressing     Problem: Infection  Goal: Absence of Infection Signs and Symptoms  Outcome: Ongoing, Progressing   Goal Outcome Evaluation:    Patient alert and oriented, PO day 3 of I&D on R foot. Foot dressedwith kerlix and ace wrap by surgery, no drainage noted, dressing change due tomorrow. Single Lumen PICC line flushing with blood return. IV antibiotics changed to Q24. Patient complains of little to no pain. Albuterol inhaler used once PRN for SOB. Patient reported improvement. Stool sample collected for C-Diff due to loose stools, awaiting results. Patient had a leg ultrasound to rule out DVT due to soreness in leg. No DVT noted. Will continue to monitor.

## 2022-09-09 NOTE — PROGRESS NOTES
"[facility] Orthopaedics Progress Note      Post-operative Day: 2 Days Post-Op    Procedure(s):  RIGHT ACHILLES TENDON INCISION, DRAINAGE AND DEBRIDEMENT    Plan:     Change dressing every other day.     Right posterior calf, point tenderness: because of patient's lack of mobility, risk after surgery, and , I am concerned about a possible blood clot, and therefore, ordered a RLE US to rule it out.   Anticoagulation protocol: scoanticoagulationlist2: Lovenox 40 mg daily and switch to oral 81mg asa daily unless otherwise indicated    Loose stool overnight: Because of patient's age, previous infections with subsequent antibiotic treatment, her current hospitalization and her description of new onset abdominal discomfort, she is at moderate risk for C. Diff infection. Although suspicion is low as patient denies watery- type stool, I ordered a C Dif PCR test. If sample is solid stool, this will be discontinued.    Abdominal discomfort: I suspect this is likely due to lack of mobility, but appreciate medical team assessment and treatment of this as indicated.     Pain medications:  scopainmedication: tylenol (oxycodone available but not used.)            Weight bearing status:  TTWB            Disposition:  TCU today vs. tomorrow based on placement recommendations and ID coordination. Recommendations from ID include IV abx x 10-14 days then switch to oral.   PICC placed yesterday. Awaiting coordination with social work team and ID.              Continue cares and rehabilitation. Care plan discussed with RNRian.             Subjective:    Joyce reports abdominal discomfort overnight \"like my belly is unsettled.\" She reported some mild nausea overnight, which has resolved by this morning. However, she reports diarrhea overnight. Although she does not describe it as watery, only loose, with her history there is some concern for C. Dif infection.    Calf tenderness: Right posterior calf. Mild to moderate point tenderness " "to the proximal superior calf, that patient describes as \"soreness, maybe from doing my exercises.\"     Pain: minimal  Chest pain, SOB:  No  Denies lightheadedness/dizziness  Denies vomiting  Passing flatus  voiding well    Objective:  Blood pressure (!) 174/70, pulse 87, temperature 98.3  F (36.8  C), temperature source Oral, resp. rate 16, height 1.626 m (5' 4\"), weight 90.9 kg (200 lb 8 oz), SpO2 96 %.    Patient Vitals for the past 24 hrs:   BP Temp Temp src Pulse Resp SpO2 Weight   09/09/22 0840 (!) 174/70 98.3  F (36.8  C) Oral 87 16 96 % --   09/09/22 0634 -- -- -- -- -- -- 90.9 kg (200 lb 8 oz)   09/09/22 0021 (!) 147/70 97.9  F (36.6  C) Oral 67 17 95 % --   09/08/22 1653 (!) 176/72 97.9  F (36.6  C) Oral 68 16 94 % --       Wt Readings from Last 4 Encounters:   09/09/22 90.9 kg (200 lb 8 oz)   08/01/22 87.1 kg (192 lb)   08/16/20 84.8 kg (187 lb)         Motor function, sensation, and circulation intact   Yes  Wound status: clean, without shadowing, dry, and intact. Yes. Patient's heel wound then covered with non-adhesive gauze, 4x4s, Kerlix, and ABD, and a large ACE bandage.   Calf: There is no redness over the area and area is mobile, but with her age and recent surgery without much movement of the lower leg, will order RLE US to rule out blood clot.     Pertinent Labs   Lab Results: personally reviewed.     Recent Labs   Lab Test 09/08/22  0941 09/07/22  0932 08/29/22  1546 08/01/22  1629 09/10/20  1215 08/17/20  0804 08/17/20  0804   HGB 11.8 11.6* 12.2 12.5 12.3   < >  --    HCT  --   --  38.8 40.0 39.7   < >  --    MCV  --   --  83 83 88   < >  --    PLT  --   --  332 327 302   < >  --    NA  --   --  138 138  --   --  139   CRP  --   --  3.18 4.40 0.6   < > 4.3*    < > = values in this interval not displayed.       Report completed by:  Mina Blas, NP  Date: 9/8/2022       "

## 2022-09-09 NOTE — PLAN OF CARE
Problem: Pain Acute  Goal: Acceptable Pain Control and Functional Ability  Outcome: Ongoing, Progressing  Intervention: Develop Pain Management Plan  Intervention: Prevent or Manage Pain     Problem: Infection  Goal: Absence of Infection Signs and Symptoms  Outcome: Ongoing, Progressing   Goal Outcome Evaluation:    Patient A&O, VSS.  Reported mild pain to RLE, managed well with scheduled tylenol.  Dressing to RLE CDI w/ orthotic off-loading boot in place w/ toe-touch weight bearing to RLE.  Ax1 w/ pivot to BSC.  Single lumen PICC was placed earlier today and has good blood return.

## 2022-09-10 ENCOUNTER — APPOINTMENT (OUTPATIENT)
Dept: PHYSICAL THERAPY | Facility: CLINIC | Age: 87
DRG: 501 | End: 2022-09-10
Attending: PODIATRIST
Payer: COMMERCIAL

## 2022-09-10 PROCEDURE — 250N000013 HC RX MED GY IP 250 OP 250 PS 637: Performed by: PODIATRIST

## 2022-09-10 PROCEDURE — 99232 SBSQ HOSP IP/OBS MODERATE 35: CPT | Performed by: INTERNAL MEDICINE

## 2022-09-10 PROCEDURE — 250N000011 HC RX IP 250 OP 636: Performed by: INTERNAL MEDICINE

## 2022-09-10 PROCEDURE — 97116 GAIT TRAINING THERAPY: CPT | Mod: GP

## 2022-09-10 PROCEDURE — 120N000001 HC R&B MED SURG/OB

## 2022-09-10 PROCEDURE — 250N000012 HC RX MED GY IP 250 OP 636 PS 637: Performed by: FAMILY MEDICINE

## 2022-09-10 PROCEDURE — 250N000011 HC RX IP 250 OP 636: Performed by: PODIATRIST

## 2022-09-10 PROCEDURE — 250N000013 HC RX MED GY IP 250 OP 250 PS 637: Performed by: FAMILY MEDICINE

## 2022-09-10 RX ADMIN — HYDROXYCHLOROQUINE SULFATE 200 MG: 200 TABLET, FILM COATED ORAL at 09:40

## 2022-09-10 RX ADMIN — PRAMIPEXOLE DIHYDROCHLORIDE 0.5 MG: 0.25 TABLET ORAL at 21:39

## 2022-09-10 RX ADMIN — LOSARTAN POTASSIUM 50 MG: 50 TABLET, FILM COATED ORAL at 09:40

## 2022-09-10 RX ADMIN — PANTOPRAZOLE SODIUM 40 MG: 20 TABLET, DELAYED RELEASE ORAL at 08:34

## 2022-09-10 RX ADMIN — CEFTRIAXONE SODIUM 2 G: 2 INJECTION, POWDER, FOR SOLUTION INTRAMUSCULAR; INTRAVENOUS at 12:22

## 2022-09-10 RX ADMIN — ENOXAPARIN SODIUM 40 MG: 100 INJECTION SUBCUTANEOUS at 12:22

## 2022-09-10 RX ADMIN — GABAPENTIN 200 MG: 100 CAPSULE ORAL at 21:39

## 2022-09-10 RX ADMIN — ONDANSETRON 4 MG: 4 TABLET, ORALLY DISINTEGRATING ORAL at 00:13

## 2022-09-10 RX ADMIN — PREDNISONE 2.5 MG: 2.5 TABLET ORAL at 09:40

## 2022-09-10 ASSESSMENT — ACTIVITIES OF DAILY LIVING (ADL)
ADLS_ACUITY_SCORE: 40
ADLS_ACUITY_SCORE: 41
ADLS_ACUITY_SCORE: 40
ADLS_ACUITY_SCORE: 41
ADLS_ACUITY_SCORE: 40

## 2022-09-10 NOTE — PLAN OF CARE
Problem: Plan of Care - These are the overarching goals to be used throughout the patient stay.    Goal: Optimal Comfort and Wellbeing  Outcome: Ongoing, Progressing     Problem: Pain Acute  Goal: Acceptable Pain Control and Functional Ability  Outcome: Ongoing, Progressing   Goal Outcome Evaluation:    Patient A&Ox4. Writer acquired patient at 1200 today. Denied pain and states her R foot is feeling very well. Wound dressing is clean, dry, and intact. On IV rocephin. Has a single lumen PICC that is patent and saline locked when not getting abx. Patient is pleasant with cares and resting in bed comfortably. Awaiting placement at TCU.

## 2022-09-10 NOTE — PLAN OF CARE
Problem: Pain Acute  Goal: Acceptable Pain Control and Functional Ability  Outcome: Ongoing, Progressing  Intervention: Prevent or Manage Pain  Recent Flowsheet Documentation  Taken 9/9/2022 1700 by Fang Luna, RN  Medication Review/Management: medications reviewed   Goal Outcome Evaluation:    A/Ox4. Denies pain. VSS. Boot in place to RLE CMS intact. TTWB on RLE, up to commode with one assist. Discharge pending TCU placement.

## 2022-09-10 NOTE — PROGRESS NOTES
"                  [facility] Progress Note      Post-operative Day: 4 Days Post-Op    Procedure(s):  RIGHT ACHILLES TENDON INCISION, DRAINAGE AND DEBRIDEMENT      Subjective:    Pain: minimal  Chest pain, SOB:  NO      Objective:  Blood pressure (!) 133/93, pulse 87, temperature 98.4  F (36.9  C), temperature source Oral, resp. rate 16, height 1.626 m (5' 4\"), weight 90.9 kg (200 lb 8 oz), SpO2 94 %.    Patient Vitals for the past 24 hrs:   BP Temp Temp src Pulse Resp SpO2   09/10/22 0828 (!) 133/93 98.4  F (36.9  C) Oral 87 16 94 %   09/09/22 2342 (!) 145/64 98.5  F (36.9  C) Oral 77 16 94 %   09/09/22 1811 (!) 140/71 -- -- 79 -- --   09/09/22 1535 (!) 174/77 -- -- 80 16 96 %       Wt Readings from Last 4 Encounters:   09/09/22 90.9 kg (200 lb 8 oz)   08/01/22 87.1 kg (192 lb)   08/16/20 84.8 kg (187 lb)         Motor function, sensation, and circulation intact including quadriceps, hamstrings, anterior tibialis, EHL and gastrocnemius   intact    Wound status: incisions are clean dry and intact.yes  Post op dressing intact:  yes  Calf tenderness: no    Pertinent Labs   Lab Results: personally reviewed.     Recent Labs   Lab Test 09/09/22  0619 09/08/22  0941 09/07/22  0932 08/29/22  1546 08/01/22  1629 09/10/20  1215 08/17/20  0804 08/17/20  0804   HGB  --  11.8 11.6* 12.2 12.5 12.3   < >  --    HCT  --   --   --  38.8 40.0 39.7   < >  --    MCV  --   --   --  83 83 88   < >  --      --   --  332 327 302   < >  --    NA  --   --   --  138 138  --   --  139   CRP 2.8*  --   --  3.18 4.40 0.6   < > 4.3*    < > = values in this interval not displayed.       Plan: Anticoagulation protocol: ASA 81mg Q day for 30 days              Pain medications:  Alternate ibuprofen and tylenol Q4-6 hours            Weight bearing status:  TTWB            Disposition: TCU            F/U clonic 5-7 days            Continue cares and rehabilitation     Report completed by:  Chan Rodríguez DPM  Date: 9/10/2022  Time: 12:39 PM  "

## 2022-09-10 NOTE — PROGRESS NOTES
[facility] Daily Progress Note    Assessment/Plan:  88 year old female with a PMH of CVA, HTN, KAI, GERD, asthma, PMR, RLS, Sjogren's and migraine headaches.  Underwent right Achilles tendon I&D by Dr. Chan Rodríguez.  ID consulted.  On Ancef.  Surgical culture shows Citrobacter.  PICC line placed.  Having some diarrhea.  Otherwise medically stable.    Recurrent right Achilles tendinitis  Status post right Achilles tendon I&D 9/6/2022  Postoperative management per podiatry.   Appreciate ID recommendations.    PICC line placed.    IV ceftriaxone until 9/19 then p.o. Omnicef for 2 weeks per ID.  Fungal cultures pending.   Awaiting TCU placement.     Diarrhea  Diarrhea improved.   Stool softeners changed to as needed.    Essential hypertension  History of CVA  Denies any deficits from her stroke.  Continue losartan 50 mg daily with hold parameters.  Migraine headaches  Patient has taken Excedrin Migraine and Imitrex in the past.  No issues at this time.  Follow clinically.  Polymyalgia rheumatica  Restless leg syndrome  Continue home gabapentin 200 mg nightly, Mirapex 0.5 mg nightly and prednisone 2.5 mg daily.  Sjogren's syndrome  Continue home Plaquenil 200 mg daily  GERD  Continue home omeprazole.  Obstructive sleep apnea  Patient does not utilize CPAP.  Follow pulse oximetry and provide supplemental oxygen as needed.  Did not require supplemental oxygen overnight.  Exercise-induced asthma  9/8 had some shortness of breath.  Has used inhaler in the past but does not have one currently.  Responded well to incentive spirometry.  Albuterol MDI available to be used as needed.  .    Diet: Regular Diet Adult  Room Service  Discharge Instruction - Regular Diet Adult  Diet  DVT Prophylaxis:  Enoxaparin (Lovenox) SQ  Code Status: Full Code    Principal Problem:    S/P foot surgery  Active Problems:    Polymyalgia rheumatica (H)    Mild intermittent asthma    Hypertension    Achilles tendon infection    Migraine headache     LOS: 4  days     Barriers to discharge: TCU placement.  Discharge Disposition: TCU    Subjective:  Joyce is laying in bed.  Denies diarrhea or abdominal cramping. No fever or chills.        cefTRIAXone  2 g Intravenous Q24H     enoxaparin ANTICOAGULANT  40 mg Subcutaneous Q24H     gabapentin  200 mg Oral At Bedtime     hydroxychloroquine  200 mg Oral Daily     losartan  50 mg Oral Daily     pantoprazole  40 mg Oral QAM AC     pramipexole  0.5 mg Oral At Bedtime     predniSONE  2.5 mg Oral Daily     sodium chloride (PF)  3 mL Intracatheter Q8H       Objective:  Vital signs in last 24 hours:  Temp:  [98.3  F (36.8  C)-98.5  F (36.9  C)] 98.5  F (36.9  C)  Pulse:  [77-87] 77  Resp:  [16] 16  BP: (140-174)/(64-77) 145/64  SpO2:  [94 %-96 %] 94 %  Weight:   Weight:   @THISENCWEIGHTS(1)@  Weight change:   Body mass index is 34.42 kg/m .    Intake/Output last 3 shifts:  I/O last 3 completed shifts:  In: 1080 [P.O.:1080]  Out: 700 [Urine:700]  Intake/Output this shift:  No intake/output data recorded.    Review of Systems:   As per subjective, all others negative.    Physical Exam:    GENERAL:  Alert, appears comfortable, in no acute distress, appears stated age   HEAD:  Normocephalic, without obvious abnormality, atraumatic   NECK: Supple, symmetrical, trachea midline   BACK:   Symmetric, no curvature, ROM normal   LUNGS:   Clear to auscultation bilaterally, no rales, rhonchi, or wheezing, symmetric chest rise on inhalation, respirations unlabored   CHEST WALL:  No tenderness or deformity   HEART:  Regular rate and rhythm, S1 and S2 normal, no murmur, rub, or gallop    ABDOMEN:   Soft, non-tender, bowel sounds active all four quadrants, no masses, no organomegaly, no rebound or guarding   EXTREMITIES: Extremities normal, atraumatic, no cyanosis or edema    SKIN: Dry to touch, no exanthems in the visualized areas   NEURO: Alert, oriented x3, moves all four extremities freely, non-focal   PSYCH: Cooperative, behavior is appropriate       Imaging:  Personally Reviewed.  Results for orders placed or performed during the hospital encounter of 09/06/22   US Lower Extremity Venous Duplex Right    Impression    IMPRESSION:  1.  No deep venous thrombosis in the right lower extremity.     Lab Results:  Personally Reviewed.   Recent Labs   Lab 09/09/22  0619 09/08/22  0941 09/07/22  0932   HGB  --  11.8 11.6*     --   --      I reviewed all labs and imaging studies as of this date and I reviewed all current inpatient medications and updated them    Brandin Cavanaugh DO, MS  [facility]  Internal Medicine

## 2022-09-10 NOTE — PLAN OF CARE
Problem: Plan of Care - These are the overarching goals to be used throughout the patient stay.    Goal: Optimal Comfort and Wellbeing  Outcome: Ongoing, Progressing     Problem: Pain Acute  Goal: Acceptable Pain Control and Functional Ability  Outcome: Ongoing, Progressing  Intervention: Prevent or Manage Pain     Goal Outcome Evaluation:    Pt remained pain free, alert and oriented x4, pivot to commode A1 with gait belt and walker, pt touch toe weight bearing, RLL offload with pillows, continue to monitor.

## 2022-09-11 ENCOUNTER — APPOINTMENT (OUTPATIENT)
Dept: PHYSICAL THERAPY | Facility: CLINIC | Age: 87
DRG: 501 | End: 2022-09-11
Attending: PODIATRIST
Payer: COMMERCIAL

## 2022-09-11 PROCEDURE — 97110 THERAPEUTIC EXERCISES: CPT | Mod: GP

## 2022-09-11 PROCEDURE — 99232 SBSQ HOSP IP/OBS MODERATE 35: CPT | Performed by: INTERNAL MEDICINE

## 2022-09-11 PROCEDURE — 120N000001 HC R&B MED SURG/OB

## 2022-09-11 PROCEDURE — 250N000013 HC RX MED GY IP 250 OP 250 PS 637: Performed by: HOSPITALIST

## 2022-09-11 PROCEDURE — 250N000013 HC RX MED GY IP 250 OP 250 PS 637: Performed by: PODIATRIST

## 2022-09-11 PROCEDURE — 97530 THERAPEUTIC ACTIVITIES: CPT | Mod: GP

## 2022-09-11 PROCEDURE — 250N000011 HC RX IP 250 OP 636: Performed by: INTERNAL MEDICINE

## 2022-09-11 PROCEDURE — 250N000013 HC RX MED GY IP 250 OP 250 PS 637: Performed by: FAMILY MEDICINE

## 2022-09-11 PROCEDURE — 250N000011 HC RX IP 250 OP 636: Performed by: PODIATRIST

## 2022-09-11 PROCEDURE — 250N000012 HC RX MED GY IP 250 OP 636 PS 637: Performed by: FAMILY MEDICINE

## 2022-09-11 RX ORDER — OXYCODONE HYDROCHLORIDE 5 MG/1
5 TABLET ORAL EVERY 6 HOURS PRN
Status: DISCONTINUED | OUTPATIENT
Start: 2022-09-11 | End: 2022-09-15 | Stop reason: HOSPADM

## 2022-09-11 RX ORDER — LACTOBACILLUS RHAMNOSUS GG 10B CELL
1 CAPSULE ORAL 2 TIMES DAILY
Status: DISCONTINUED | OUTPATIENT
Start: 2022-09-11 | End: 2022-09-15 | Stop reason: HOSPADM

## 2022-09-11 RX ORDER — LOPERAMIDE HCL 2 MG
2 CAPSULE ORAL 4 TIMES DAILY PRN
Status: DISCONTINUED | OUTPATIENT
Start: 2022-09-11 | End: 2022-09-15 | Stop reason: HOSPADM

## 2022-09-11 RX ADMIN — LOPERAMIDE HYDROCHLORIDE 2 MG: 2 CAPSULE ORAL at 20:22

## 2022-09-11 RX ADMIN — CEFTRIAXONE SODIUM 2 G: 2 INJECTION, POWDER, FOR SOLUTION INTRAMUSCULAR; INTRAVENOUS at 12:34

## 2022-09-11 RX ADMIN — PREDNISONE 2.5 MG: 2.5 TABLET ORAL at 08:45

## 2022-09-11 RX ADMIN — ONDANSETRON 4 MG: 4 TABLET, ORALLY DISINTEGRATING ORAL at 08:45

## 2022-09-11 RX ADMIN — PANTOPRAZOLE SODIUM 40 MG: 20 TABLET, DELAYED RELEASE ORAL at 05:45

## 2022-09-11 RX ADMIN — PRAMIPEXOLE DIHYDROCHLORIDE 0.5 MG: 0.25 TABLET ORAL at 20:22

## 2022-09-11 RX ADMIN — LOSARTAN POTASSIUM 50 MG: 50 TABLET, FILM COATED ORAL at 08:45

## 2022-09-11 RX ADMIN — GABAPENTIN 200 MG: 100 CAPSULE ORAL at 20:22

## 2022-09-11 RX ADMIN — Medication 1 CAPSULE: at 20:22

## 2022-09-11 RX ADMIN — HYDROXYCHLOROQUINE SULFATE 200 MG: 200 TABLET, FILM COATED ORAL at 08:45

## 2022-09-11 RX ADMIN — ENOXAPARIN SODIUM 40 MG: 100 INJECTION SUBCUTANEOUS at 12:34

## 2022-09-11 ASSESSMENT — ACTIVITIES OF DAILY LIVING (ADL)
ADLS_ACUITY_SCORE: 41
ADLS_ACUITY_SCORE: 40
ADLS_ACUITY_SCORE: 41
ADLS_ACUITY_SCORE: 40

## 2022-09-11 NOTE — PLAN OF CARE
Problem: Infection  Goal: Absence of Infection Signs and Symptoms  Outcome: Ongoing, Progressing   Goal Outcome Evaluation:  Patient A&Ox4. CMS intact, pulse palpable to RLE under dressing. Dressing CDI. Continues on IV antibiotics. PICC in place, boot in place. Awaiting TCU placement

## 2022-09-11 NOTE — PLAN OF CARE
Problem: Plan of Care - These are the overarching goals to be used throughout the patient stay.    Goal: Optimal Comfort and Wellbeing  Outcome: Ongoing, Progressing     Problem: Nausea and Vomiting  Goal: Fluid and Electrolyte Balance  Intervention: Prevent and Manage Nausea and Vomiting  Recent Flowsheet Documentation  Taken 9/11/2022 0800 by Uma Mooney RN  Nausea/Vomiting Interventions: antiemetic   Goal Outcome Evaluation:    A&Ox4. Patient denies pain today. She reported some nausea/queasiness and was given Zofran PO. Patient is still having some loose stools (C diff was neg) and thinks it could be the abx causing this. Writer made a note to see if she can get PRN Immodium to help. Patient has a single lumen PICC, patent, good blood return, saline locked. IV rocephin infusing currently. Patients dressing is changed every other day and due tomorrow 9/12/22. Awaiting TCU placement, patient will go with PICC line for long term abx.

## 2022-09-11 NOTE — PROGRESS NOTES
[facility] Daily Progress Note    Assessment/Plan:  88 year old female with a PMH of CVA, HTN, KAI, GERD, asthma, PMR, RLS, Sjogren's and migraine headaches.  Underwent right Achilles tendon I&D by Dr. Chan Rodríguez.  ID consulted.  On Ancef.  Surgical culture shows Citrobacter.  PICC line placed.  Awaiting TCU placement.     Recurrent right Achilles tendinitis  Status post right Achilles tendon I&D 9/6/2022  Postoperative management per podiatry.   Appreciate ID recommendations.    PICC line placed.    IV ceftriaxone until 9/19 then p.o. Omnicef for 2 weeks per ID.  Fungal cultures pending.   Right lower leg US negative for DVT 9/11/22.  Awaiting TCU placement.      Diarrhea  Diarrhea improved.  Stool softeners changed to as needed.     Nausea  Continue antiemetics as needed  Decrease oxycodone as needed frequency to every 6 hours as needed.    Essential hypertension  History of CVA  Denies any deficits from her stroke.  Continue losartan 50 mg daily with hold parameters.  Migraine headaches  Patient has taken Excedrin Migraine and Imitrex in the past.  No issues at this time.  Follow clinically.  Polymyalgia rheumatica  Restless leg syndrome  Continue home gabapentin 200 mg nightly, Mirapex 0.5 mg nightly and prednisone 2.5 mg daily.  Sjogren's syndrome  Continue home Plaquenil 200 mg daily  GERD  Continue home omeprazole.  Obstructive sleep apnea  Patient does not utilize CPAP.  Follow pulse oximetry and provide supplemental oxygen as needed.  Did not require supplemental oxygen overnight.  Exercise-induced asthma  9/8 had some shortness of breath.  Has used inhaler in the past but does not have one currently.  Responded well to incentive spirometry.  Albuterol MDI available to be used as needed.  .       Diet: Regular Diet Adult  Room Service  Discharge Instruction - Regular Diet Adult  Diet  DVT Prophylaxis:  Enoxaparin (Lovenox) SQ  Code Status: Full Code    Principal Problem:    S/P foot surgery  Active Problems:     Polymyalgia rheumatica (H)    Mild intermittent asthma    Hypertension    Achilles tendon infection    Migraine headache     LOS: 5 days     Barriers to discharge: TCU placement.  Discharge Disposition: TCU    Subjective:  Joyce reported some mild nausea which was improved with antiemetic administration.  She denies any chest pain or shortness of breath.  No diarrhea.    cefTRIAXone  2 g Intravenous Q24H     enoxaparin ANTICOAGULANT  40 mg Subcutaneous Q24H     gabapentin  200 mg Oral At Bedtime     hydroxychloroquine  200 mg Oral Daily     losartan  50 mg Oral Daily     pantoprazole  40 mg Oral QAM AC     pramipexole  0.5 mg Oral At Bedtime     predniSONE  2.5 mg Oral Daily     sodium chloride (PF)  3 mL Intracatheter Q8H       Objective:  Vital signs in last 24 hours:  Temp:  [97.8  F (36.6  C)-98.4  F (36.9  C)] 97.8  F (36.6  C)  Pulse:  [78-87] 78  Resp:  [16-19] 19  BP: (125-133)/(65-93) 129/65  Cuff Mean (mmHg):  [110] 110  SpO2:  [92 %-95 %] 92 %  Weight:   Weight:   @THISENCWEIGHTS(1)@  Weight change:   Body mass index is 33.16 kg/m .    Intake/Output last 3 shifts:  I/O last 3 completed shifts:  In: 960 [P.O.:960]  Out: 2100 [Urine:2100]  Intake/Output this shift:  No intake/output data recorded.    Review of Systems:   As per subjective, all others negative.    Physical Exam:    GENERAL:  Alert, appears comfortable, in no acute distress, appears stated age   HEAD:  Normocephalic, without obvious abnormality, atraumatic   EYES:  PERRL, conjunctiva/corneas clear, no scleral icterus, EOM's intact   NOSE: Nares normal, septum midline, mucosa normal, no drainage   THROAT: Lips, mucosa, and tongue normal; teeth and gums normal, mouth moist   NECK: Supple, symmetrical, trachea midline   BACK:   Symmetric, no curvature, ROM normal   LUNGS:   Clear to auscultation bilaterally, no rales, rhonchi, or wheezing, symmetric chest rise on inhalation, respirations unlabored   CHEST WALL:  No tenderness or deformity    HEART:  Regular rate and rhythm, S1 and S2 normal, no murmur, rub, or gallop    ABDOMEN:   Soft, non-tender, bowel sounds active all four quadrants, no masses, no organomegaly, no rebound or guarding   EXTREMITIES: Extremities normal, atraumatic, no cyanosis or edema    SKIN: Dry to touch, no exanthems in the visualized areas   NEURO: Alert, oriented x3, moves all four extremities freely, non-focal   PSYCH: Cooperative, behavior is appropriate      Imaging:  Personally Reviewed.  Results for orders placed or performed during the hospital encounter of 09/06/22   US Lower Extremity Venous Duplex Right    Impression    IMPRESSION:  1.  No deep venous thrombosis in the right lower extremity.       Lab Results:  Personally Reviewed.   Recent Labs   Lab 09/09/22  0619 09/08/22  0941 09/07/22  0932   HGB  --  11.8 11.6*     --   --      No results for input(s): NA, CO2, BUN, CREATININE, ALBUMIN, BILITOT, ALKPHOS, ALT, AST, GLUCOSE in the last 168 hours.    Invalid input(s): K, CL, CALCIUM, LABALBU, PROT  No results for input(s): INR in the last 168 hours.    I reviewed all labs and imaging studies as of this date and I reviewed all current inpatient medications and updated them    Brandin Cavanaugh DO, MS  [facility] Service  Internal Medicine

## 2022-09-12 ENCOUNTER — TELEPHONE (OUTPATIENT)
Dept: INFECTIOUS DISEASES | Facility: CLINIC | Age: 87
End: 2022-09-12

## 2022-09-12 ENCOUNTER — APPOINTMENT (OUTPATIENT)
Dept: OCCUPATIONAL THERAPY | Facility: CLINIC | Age: 87
DRG: 501 | End: 2022-09-12
Attending: PODIATRIST
Payer: COMMERCIAL

## 2022-09-12 LAB
HOLD SPECIMEN: NORMAL
PLATELET # BLD AUTO: 272 10E3/UL (ref 150–450)

## 2022-09-12 PROCEDURE — 99232 SBSQ HOSP IP/OBS MODERATE 35: CPT | Performed by: INTERNAL MEDICINE

## 2022-09-12 PROCEDURE — 250N000012 HC RX MED GY IP 250 OP 636 PS 637: Performed by: FAMILY MEDICINE

## 2022-09-12 PROCEDURE — 250N000013 HC RX MED GY IP 250 OP 250 PS 637: Performed by: PODIATRIST

## 2022-09-12 PROCEDURE — 120N000001 HC R&B MED SURG/OB

## 2022-09-12 PROCEDURE — 250N000011 HC RX IP 250 OP 636: Performed by: INTERNAL MEDICINE

## 2022-09-12 PROCEDURE — 250N000013 HC RX MED GY IP 250 OP 250 PS 637: Performed by: FAMILY MEDICINE

## 2022-09-12 PROCEDURE — 250N000013 HC RX MED GY IP 250 OP 250 PS 637: Performed by: HOSPITALIST

## 2022-09-12 PROCEDURE — 250N000011 HC RX IP 250 OP 636: Performed by: PODIATRIST

## 2022-09-12 PROCEDURE — 36415 COLL VENOUS BLD VENIPUNCTURE: CPT | Performed by: PODIATRIST

## 2022-09-12 PROCEDURE — 85049 AUTOMATED PLATELET COUNT: CPT | Performed by: PODIATRIST

## 2022-09-12 PROCEDURE — 97535 SELF CARE MNGMENT TRAINING: CPT | Mod: GO

## 2022-09-12 RX ADMIN — Medication 1 CAPSULE: at 08:08

## 2022-09-12 RX ADMIN — HYDROXYCHLOROQUINE SULFATE 200 MG: 200 TABLET, FILM COATED ORAL at 08:08

## 2022-09-12 RX ADMIN — PANTOPRAZOLE SODIUM 40 MG: 20 TABLET, DELAYED RELEASE ORAL at 06:24

## 2022-09-12 RX ADMIN — CEFTRIAXONE SODIUM 2 G: 2 INJECTION, POWDER, FOR SOLUTION INTRAMUSCULAR; INTRAVENOUS at 11:39

## 2022-09-12 RX ADMIN — Medication 1 CAPSULE: at 21:02

## 2022-09-12 RX ADMIN — PREDNISONE 2.5 MG: 2.5 TABLET ORAL at 08:08

## 2022-09-12 RX ADMIN — PRAMIPEXOLE DIHYDROCHLORIDE 0.5 MG: 0.25 TABLET ORAL at 21:02

## 2022-09-12 RX ADMIN — LOPERAMIDE HYDROCHLORIDE 2 MG: 2 CAPSULE ORAL at 10:38

## 2022-09-12 RX ADMIN — LOSARTAN POTASSIUM 50 MG: 50 TABLET, FILM COATED ORAL at 08:08

## 2022-09-12 RX ADMIN — GABAPENTIN 200 MG: 100 CAPSULE ORAL at 21:02

## 2022-09-12 RX ADMIN — ENOXAPARIN SODIUM 40 MG: 100 INJECTION SUBCUTANEOUS at 11:39

## 2022-09-12 ASSESSMENT — ACTIVITIES OF DAILY LIVING (ADL)
ADLS_ACUITY_SCORE: 40
ADLS_ACUITY_SCORE: 37
ADLS_ACUITY_SCORE: 40
ADLS_ACUITY_SCORE: 40
ADLS_ACUITY_SCORE: 37
ADLS_ACUITY_SCORE: 40

## 2022-09-12 NOTE — PROGRESS NOTES
[facility] Daily Progress Note    Assessment/Plan:  88 year old female with a PMH of CVA, HTN, KAI, GERD, asthma, PMR, RLS, Sjogren's and migraine headaches.  Underwent right Achilles tendon I&D by Dr. Chan Rodríguez.  ID consulted.  On ceftriaxone.  Surgical culture shows Citrobacter.  PICC line placed.  Awaiting TCU placement.     Recurrent right Achilles tendinitis  Status post right Achilles tendon I&D 9/6/2022  Postoperative management per podiatry.   Appreciate ID recommendations.    PICC line placed.    IV ceftriaxone until 9/19 then p.o. Omnicef for 2 weeks per ID.  Fungal cultures NGTD.  Right lower leg US negative for DVT 9/11/22.  Awaiting TCU placement.      Antibiotic associated diarrhea  Diarrhea improved after discontinued bowel regimen.   Previous Cdiff PCR on 9/9/22 negative.   Continue culturelle BID  Continue loperamide as needed for diarrhea.     Essential hypertension  History of CVA  Denies any deficits from her stroke.  Continue losartan 50 mg daily with hold parameters.  Migraine headaches  Patient has taken Excedrin Migraine and Imitrex in the past.  No issues at this time.  Follow clinically.  Polymyalgia rheumatica  Restless leg syndrome  Continue home gabapentin 200 mg nightly, Mirapex 0.5 mg nightly and prednisone 2.5 mg daily.  Sjogren's syndrome  Continue home Plaquenil 200 mg daily  GERD  Continue home omeprazole.  Obstructive sleep apnea  Patient does not utilize CPAP.  Follow pulse oximetry and provide supplemental oxygen as needed.  Did not require supplemental oxygen overnight.  Exercise-induced asthma  9/8 had some shortness of breath.  Has used inhaler in the past but does not have one currently.  Responded well to incentive spirometry.  Albuterol MDI available to be used as needed.  .    Diet: Regular Diet Adult  Room Service  Discharge Instruction - Regular Diet Adult  Diet  DVT Prophylaxis:  Enoxaparin (Lovenox) SQ  Code Status: Full Code    Principal Problem:    S/P foot  surgery  Active Problems:    Polymyalgia rheumatica (H)    Mild intermittent asthma    Hypertension    Achilles tendon infection    Migraine headache     LOS: 6 days     Barriers to discharge: TCU placement.  Discharge Disposition: TCU    Subjective:  Joyce is laying in bed.  She had one diarrheal stool this morning and was given loperamide.  She denies frequent diarrhea at this time.  No abdominal pain.  No fever or chills.       cefTRIAXone  2 g Intravenous Q24H     enoxaparin ANTICOAGULANT  40 mg Subcutaneous Q24H     gabapentin  200 mg Oral At Bedtime     hydroxychloroquine  200 mg Oral Daily     lactobacillus rhamnosus (GG)  1 capsule Oral BID     losartan  50 mg Oral Daily     pantoprazole  40 mg Oral QAM AC     pramipexole  0.5 mg Oral At Bedtime     predniSONE  2.5 mg Oral Daily     sodium chloride (PF)  3 mL Intracatheter Q8H       Objective:  Vital signs in last 24 hours:  Temp:  [98.1  F (36.7  C)-98.2  F (36.8  C)] 98.2  F (36.8  C)  Pulse:  [77-96] 96  Resp:  [16-18] 18  BP: (121-142)/(63-66) 121/66  SpO2:  [94 %-96 %] 96 %  Weight:   Weight:   @THISENCWEIGHTS(1)@  Weight change:   Body mass index is 33.16 kg/m .    Intake/Output last 3 shifts:  I/O last 3 completed shifts:  In: 480 [P.O.:480]  Out: 1800 [Urine:1800]  Intake/Output this shift:  I/O this shift:  In: 480 [P.O.:480]  Out: -     Review of Systems:   As per subjective, all others negative.    Physical Exam:    GENERAL:  Alert, appears comfortable, in no acute distress, appears stated age   HEAD:  Normocephalic, without obvious abnormality, atraumatic   NECK: Supple, symmetrical, trachea midline   LUNGS:   Clear to auscultation bilaterally, no rales, rhonchi, or wheezing, symmetric chest rise on inhalation, respirations unlabored   HEART:  Regular rate and rhythm, S1 and S2 normal, no murmur, rub, or gallop    ABDOMEN:   Soft, non-tender, bowel sounds active all four quadrants, no masses, no organomegaly, no rebound or guarding   EXTREMITIES:  Extremities normal, atraumatic, no cyanosis or edema    SKIN: Dry to touch, no exanthems in the visualized areas   NEURO: Alert, oriented x3, moves all four extremities freely, non-focal   PSYCH: Cooperative, behavior is appropriate      Imaging:  Personally Reviewed.  Results for orders placed or performed during the hospital encounter of 09/06/22   US Lower Extremity Venous Duplex Right    Impression    IMPRESSION:  1.  No deep venous thrombosis in the right lower extremity.       Lab Results:  Personally Reviewed.   Recent Labs   Lab 09/12/22  0651 09/09/22  0619 09/08/22  0941 09/07/22  0932   HGB  --   --  11.8 11.6*    279  --   --      I reviewed all labs and imaging studies as of this date and I reviewed all current inpatient medications and updated them    Brandin Cavanaugh DO, MS  [facility] Service  Internal Medicine

## 2022-09-12 NOTE — PLAN OF CARE
"  Problem: Plan of Care - These are the overarching goals to be used throughout the patient stay.    Goal: Plan of Care Review/Shift Note  Description: The Plan of Care Review/Shift note should be completed every shift.  The Outcome Evaluation is a brief statement about your assessment that the patient is improving, declining, or no change.  This information will be displayed automatically on your shift note.  Outcome: Ongoing, Progressing  Goal: Patient-Specific Goal (Individualized)  Description: You can add care plan individualizations to a care plan. Examples of Individualization might be:  \"Parent requests to be called daily at 9am for status\", \"I have a hard time hearing out of my right ear\", or \"Do not touch me to wake me up as it startles me\".  Outcome: Ongoing, Progressing  Goal: Absence of Hospital-Acquired Illness or Injury  Outcome: Ongoing, Progressing  Intervention: Identify and Manage Fall Risk  Recent Flowsheet Documentation  Taken 9/12/2022 0800 by Mica Mac RN  Safety Promotion/Fall Prevention:   fall prevention program maintained   clutter free environment maintained   lighting adjusted  Intervention: Prevent Skin Injury  Recent Flowsheet Documentation  Taken 9/12/2022 1300 by Mica Mac RN  Body Position: weight shifting  Taken 9/12/2022 0800 by Mica Mac RN  Body Position: position changed independently  Intervention: Prevent and Manage VTE (Venous Thromboembolism) Risk  Recent Flowsheet Documentation  Taken 9/12/2022 1000 by Mica Mac RN  Activity Management: ambulated in room  Taken 9/12/2022 0800 by Mica Mac RN  Activity Management: up in chair  Goal: Optimal Comfort and Wellbeing  Outcome: Ongoing, Progressing  Goal: Readiness for Transition of Care  Outcome: Ongoing, Progressing   Goal Outcome Evaluation:    Pt awaiting TCU placement. VSS during the day, patient is afebrile. Ambulating to chair with assist of one, she is toe-touch weight bearing on the " right. Pt denies pain, shortness of breath, or chest pain. Dressing changed today with 4x4, ABD, Kerlix and ACE wrap - dressing was saturated with serosanguinous fluid. Sutures in place, skin is warm, mildly edematous, and oozy.  Pt continues to have loose stools, probiotic and imodium given. RN will continue to assess.     Mica Mac RN on 9/12/2022 at 2:18 PM

## 2022-09-12 NOTE — PLAN OF CARE
Goal Outcome Evaluation:        Overall Patient Progress: improving    Outcome Evaluation: Pt A/O, pleasant. Denies pain. Dressing in place to RLE. PICC patent. Plans to go to TCU for IV ABX.

## 2022-09-12 NOTE — PROGRESS NOTES
Care Management Follow Up    Length of Stay (days): 6    Expected Discharge Date: 09/13/2022     Concerns to be Addressed: TCU placment     Patient plan of care discussed at interdisciplinary rounds: Yes    Anticipated Discharge Disposition: TCU     Anticipated Discharge Services:  TCU    Education Provided on the Discharge Plan:  AVS per bedside RN.    Patient/Family in Agreement with the Plan:  yes    Referrals Placed by CM/SW:  TCU    Private pay costs discussed: transportation discussed    Additional Information:  Chart reviewed. CM met with patient. Patient is agreeable to TCU and gave CM choices. CM sent referral and other referrals are pending. Patient asked CM to arrange w/c  transportation for discharge. CM will continue to follow.     TCU referrals pending  [facility] TR[facility] (SNF)  [facility]  [facility] ([facility]  [facility]  [facility] ()    Additional referrals sent per patient request  [facility] (SNF)  [facility] (SNF)      Neha Padilla RN

## 2022-09-12 NOTE — PLAN OF CARE
Goal Outcome Evaluation:    Plan of Care Reviewed With: patient     Overall Patient Progress: improving    Outcome Evaluation: Patient slept well during the shift. patient had no loose stools on night. PICC patent. Patient will need TCU and long term antobiotics.

## 2022-09-12 NOTE — PROGRESS NOTES
Remote Cross Cover Note    Paged regarding diarrhea.  It appears this is presumed to be antibiotic associated diarrhea.  C. difficile testing was negative on 9/9.  We will start patient on Culturelle probiotic as well as Imodium as needed.    Ernie Peter MD  [facility] Medicine Service

## 2022-09-12 NOTE — PLAN OF CARE
Joyce had 3 loose stools this evening. C/O frustration with diarrhea and pending TCU placement. Wishes to go home. Discussed home safety. Gave immodium and new Culturelle to hopefully stop loose stools. Toe touch pivot transfer to BSC for activity. LE CMS intact.       Problem: Plan of Care - These are the overarching goals to be used throughout the patient stay.    Goal: Optimal Comfort and Wellbeing  Outcome: Ongoing, Progressing     Problem: Pain Acute  Goal: Acceptable Pain Control and Functional Ability  Outcome: Ongoing, Progressing  Intervention: Prevent or Manage Pain  Recent Flowsheet Documentation  Taken 9/11/2022 1554 by Alana Roberts, RN  Medication Review/Management: medications reviewed   Goal Outcome Evaluation:

## 2022-09-13 ENCOUNTER — APPOINTMENT (OUTPATIENT)
Dept: PHYSICAL THERAPY | Facility: CLINIC | Age: 87
DRG: 501 | End: 2022-09-13
Attending: PODIATRIST
Payer: COMMERCIAL

## 2022-09-13 ENCOUNTER — APPOINTMENT (OUTPATIENT)
Dept: OCCUPATIONAL THERAPY | Facility: CLINIC | Age: 87
DRG: 501 | End: 2022-09-13
Attending: PODIATRIST
Payer: COMMERCIAL

## 2022-09-13 LAB
BACTERIA WND CULT: NORMAL
FLUAV RNA SPEC QL NAA+PROBE: NEGATIVE
FLUBV RNA RESP QL NAA+PROBE: NEGATIVE
RSV RNA SPEC NAA+PROBE: NEGATIVE
SARS-COV-2 RNA RESP QL NAA+PROBE: NEGATIVE

## 2022-09-13 PROCEDURE — 250N000011 HC RX IP 250 OP 636: Performed by: PODIATRIST

## 2022-09-13 PROCEDURE — 97110 THERAPEUTIC EXERCISES: CPT | Mod: GO

## 2022-09-13 PROCEDURE — 250N000013 HC RX MED GY IP 250 OP 250 PS 637: Performed by: INTERNAL MEDICINE

## 2022-09-13 PROCEDURE — 250N000012 HC RX MED GY IP 250 OP 636 PS 637: Performed by: FAMILY MEDICINE

## 2022-09-13 PROCEDURE — 97535 SELF CARE MNGMENT TRAINING: CPT | Mod: GO

## 2022-09-13 PROCEDURE — 250N000013 HC RX MED GY IP 250 OP 250 PS 637: Performed by: HOSPITALIST

## 2022-09-13 PROCEDURE — 250N000011 HC RX IP 250 OP 636: Performed by: INTERNAL MEDICINE

## 2022-09-13 PROCEDURE — 97530 THERAPEUTIC ACTIVITIES: CPT | Mod: GP

## 2022-09-13 PROCEDURE — 99232 SBSQ HOSP IP/OBS MODERATE 35: CPT | Performed by: INTERNAL MEDICINE

## 2022-09-13 PROCEDURE — 250N000013 HC RX MED GY IP 250 OP 250 PS 637: Performed by: PODIATRIST

## 2022-09-13 PROCEDURE — 120N000001 HC R&B MED SURG/OB

## 2022-09-13 PROCEDURE — 250N000013 HC RX MED GY IP 250 OP 250 PS 637: Performed by: FAMILY MEDICINE

## 2022-09-13 PROCEDURE — 87637 SARSCOV2&INF A&B&RSV AMP PRB: CPT | Performed by: HOSPITALIST

## 2022-09-13 RX ORDER — LOPERAMIDE HCL 2 MG
2 CAPSULE ORAL 3 TIMES DAILY
Status: COMPLETED | OUTPATIENT
Start: 2022-09-13 | End: 2022-09-13

## 2022-09-13 RX ADMIN — Medication 1 CAPSULE: at 20:14

## 2022-09-13 RX ADMIN — CEFTRIAXONE SODIUM 2 G: 2 INJECTION, POWDER, FOR SOLUTION INTRAMUSCULAR; INTRAVENOUS at 12:24

## 2022-09-13 RX ADMIN — LOPERAMIDE HYDROCHLORIDE 2 MG: 2 CAPSULE ORAL at 20:13

## 2022-09-13 RX ADMIN — PREDNISONE 2.5 MG: 2.5 TABLET ORAL at 09:29

## 2022-09-13 RX ADMIN — LOPERAMIDE HYDROCHLORIDE 2 MG: 2 CAPSULE ORAL at 12:24

## 2022-09-13 RX ADMIN — Medication 1 CAPSULE: at 09:29

## 2022-09-13 RX ADMIN — GABAPENTIN 200 MG: 100 CAPSULE ORAL at 20:14

## 2022-09-13 RX ADMIN — LOPERAMIDE HYDROCHLORIDE 2 MG: 2 CAPSULE ORAL at 14:55

## 2022-09-13 RX ADMIN — PANTOPRAZOLE SODIUM 40 MG: 20 TABLET, DELAYED RELEASE ORAL at 07:05

## 2022-09-13 RX ADMIN — ENOXAPARIN SODIUM 40 MG: 100 INJECTION SUBCUTANEOUS at 12:24

## 2022-09-13 RX ADMIN — LOSARTAN POTASSIUM 50 MG: 50 TABLET, FILM COATED ORAL at 09:29

## 2022-09-13 RX ADMIN — HYDROXYCHLOROQUINE SULFATE 200 MG: 200 TABLET, FILM COATED ORAL at 09:29

## 2022-09-13 RX ADMIN — PRAMIPEXOLE DIHYDROCHLORIDE 0.5 MG: 0.25 TABLET ORAL at 20:14

## 2022-09-13 ASSESSMENT — ACTIVITIES OF DAILY LIVING (ADL)
ADLS_ACUITY_SCORE: 37
ADLS_ACUITY_SCORE: 40
ADLS_ACUITY_SCORE: 37
ADLS_ACUITY_SCORE: 40
ADLS_ACUITY_SCORE: 37
ADLS_ACUITY_SCORE: 40

## 2022-09-13 NOTE — PROGRESS NOTES
[facility] Daily Progress Note    Assessment/Plan:  88 year old female with a PMH of CVA, HTN, KAI, GERD, asthma, PMR, RLS, Sjogren's and migraine headaches.  Underwent right Achilles tendon I&D by Dr. Chan Rodríguez.  ID consulted.  On ceftriaxone.  Surgical culture shows Citrobacter.  PICC line placed.  Awaiting TCU placement.     Recurrent right Achilles tendinitis  Status post right Achilles tendon I&D 9/6/2022  Postoperative management per podiatry.   Appreciate ID recommendations.    PICC line placed.    IV ceftriaxone until 9/19 then p.o. Omnicef for 2 weeks per ID.  Fungal cultures NGTD.  Right lower leg US negative for DVT 9/11/22.  Awaiting TCU placement.      Antibiotic associated diarrhea  Diarrhea improved after discontinued bowel regimen.   Previous Cdiff PCR on 9/9/22 negative.   Continue culturelle BID  Order scheduled loperamide 2 mg TID for 3 doses.   Continue loperamide as needed for diarrhea.      Essential hypertension  History of CVA  Denies any deficits from her stroke.  Continue losartan 50 mg daily with hold parameters.  Migraine headaches  Patient has taken Excedrin Migraine and Imitrex in the past.  No issues at this time.  Follow clinically.  Polymyalgia rheumatica  Restless leg syndrome  Continue home gabapentin 200 mg nightly, Mirapex 0.5 mg nightly and prednisone 2.5 mg daily.  Sjogren's syndrome  Continue home Plaquenil 200 mg daily  GERD  Continue home omeprazole.  Obstructive sleep apnea  Patient does not utilize CPAP.  Follow pulse oximetry and provide supplemental oxygen as needed.  Did not require supplemental oxygen overnight.  Exercise-induced asthma  9/8 had some shortness of breath.  Has used inhaler in the past but does not have one currently.  Responded well to incentive spirometry.  Albuterol MDI available to be used as needed.  .    Diet: Regular Diet Adult  Room Service  Discharge Instruction - Regular Diet Adult  Diet  DVT Prophylaxis:  Enoxaparin (Lovenox) SQ  Code Status: Full  Code    Principal Problem:    S/P foot surgery  Active Problems:    Polymyalgia rheumatica (H)    Mild intermittent asthma    Hypertension    Achilles tendon infection    Migraine headache     LOS: 7 days     Barriers to discharge: TCU  Discharge Disposition: TCU    Subjective:  Joyce reports 2 episodes of diarrhea.  She denies abdominal cramping or pain.  No fever or chills.       cefTRIAXone  2 g Intravenous Q24H     enoxaparin ANTICOAGULANT  40 mg Subcutaneous Q24H     gabapentin  200 mg Oral At Bedtime     hydroxychloroquine  200 mg Oral Daily     lactobacillus rhamnosus (GG)  1 capsule Oral BID     losartan  50 mg Oral Daily     pantoprazole  40 mg Oral QAM AC     pramipexole  0.5 mg Oral At Bedtime     predniSONE  2.5 mg Oral Daily     sodium chloride (PF)  3 mL Intracatheter Q8H       Objective:  Vital signs in last 24 hours:  Temp:  [97.8  F (36.6  C)-98.7  F (37.1  C)] 98.7  F (37.1  C)  Pulse:  [76-96] 78  Resp:  [16-18] 17  BP: (121-157)/(66-95) 157/74  SpO2:  [94 %-96 %] 96 %  Weight:   Weight:   @THISENCWEIGHTS(1)@  Weight change:   Body mass index is 34.07 kg/m .    Intake/Output last 3 shifts:  I/O last 3 completed shifts:  In: 1920 [P.O.:1920]  Out: -   Intake/Output this shift:  No intake/output data recorded.    Review of Systems:   As per subjective, all others negative.    Physical Exam:    GENERAL:  Alert, appears comfortable, in no acute distress, appears stated age   HEAD:  Normocephalic, without obvious abnormality, atraumatic   NECK: Supple, symmetrical, trachea midline   LUNGS:   Clear to auscultation bilaterally, no rales, rhonchi, or wheezing, symmetric chest rise on inhalation, respirations unlabored   HEART:  Regular rate and rhythm, S1 and S2 normal, no murmur, rub, or gallop    ABDOMEN:   Soft, non-tender, bowel sounds active all four quadrants, no masses, no organomegaly, no rebound or guarding   EXTREMITIES: Extremities normal, atraumatic, no cyanosis or edema    SKIN: Dry to touch,  no exanthems in the visualized areas   NEURO: Alert, oriented x3, moves all four extremities freely, non-focal   PSYCH: Cooperative, behavior is appropriate      Imaging:  Personally Reviewed.  Results for orders placed or performed during the hospital encounter of 09/06/22   US Lower Extremity Venous Duplex Right    Impression    IMPRESSION:  1.  No deep venous thrombosis in the right lower extremity.       Lab Results:  Personally Reviewed.   Recent Labs   Lab 09/12/22  0651 09/09/22  0619 09/08/22  0941 09/07/22  0932   HGB  --   --  11.8 11.6*    279  --   --      No results for input(s): NA, CO2, BUN, CREATININE, ALBUMIN, BILITOT, ALKPHOS, ALT, AST, GLUCOSE in the last 168 hours.    Invalid input(s): K, CL, CALCIUM, LABALBU, PROT  No results for input(s): INR in the last 168 hours.    I reviewed all labs and imaging studies as of this date and I reviewed all current inpatient medications and updated them    Brandin Cavanaugh DO, MS  [facility] Service  Internal Medicine

## 2022-09-13 NOTE — PLAN OF CARE
Problem: Plan of Care - These are the overarching goals to be used throughout the patient stay.    Goal: Plan of Care Review/Shift Note  Description: The Plan of Care Review/Shift note should be completed every shift.  The Outcome Evaluation is a brief statement about your assessment that the patient is improving, declining, or no change.  This information will be displayed automatically on your shift note.  Outcome: Ongoing, Progressing     Problem: Plan of Care - These are the overarching goals to be used throughout the patient stay.    Goal: Optimal Comfort and Wellbeing  Outcome: Ongoing, Progressing   Goal Outcome Evaluation:      VSS on RA. No complaints of pain. Still on ABX, reported one loose stool today. Reg diet tolerating well. Discharge pending placement

## 2022-09-13 NOTE — PROGRESS NOTES
Care Management Follow Up    Length of Stay (days): 7    Expected Discharge Date: 09/14/2022 pending accepting TCU bed placement     Concerns to be Addressed:  TCU placement     Patient plan of care discussed at interdisciplinary rounds: Yes    Anticipated Discharge Disposition: Skilled Nursing Facility     Anticipated Discharge Services:  TCU  Anticipated Discharge DME:  N/A    Patient/family educated on Medicare website which has current facility and service quality ratings: yes   Education Provided on the Discharge Plan:per team    Patient/Family in Agreement with the Plan: yes    Referrals Placed by CM/SW: additional referrals sent   Private pay costs discussed: not at this time    Additional Information:  Left messages on pending TCU referrals, contact information provided requesting call back[facility]  [facility]

## 2022-09-13 NOTE — PROGRESS NOTES
CLINICAL NUTRITION SERVICES    Reviewed nutrition risk factors due to LOS. Pt is tolerating diet, eating well per nursing documentation. No nutrition issues identified at this time. RD will follow via further los, unless consulted.

## 2022-09-13 NOTE — PLAN OF CARE
Goal Outcome Evaluation:  Pt denies pain. Stated her cough started yesterday and described it as unproductive and dry. Pt was COVID swabbed as she was exposed last week. COVID/RSV/FLU came back negative.

## 2022-09-13 NOTE — PLAN OF CARE
Goal Outcome Evaluation:  Patient is alert, oriented, endorses pain but states it is minimal, still having loose stools, imodium given, transferring with assist of 1, vitally stable, awaiting TCU placement.

## 2022-09-14 ENCOUNTER — APPOINTMENT (OUTPATIENT)
Dept: OCCUPATIONAL THERAPY | Facility: CLINIC | Age: 87
DRG: 501 | End: 2022-09-14
Attending: PODIATRIST
Payer: COMMERCIAL

## 2022-09-14 ENCOUNTER — APPOINTMENT (OUTPATIENT)
Dept: PHYSICAL THERAPY | Facility: CLINIC | Age: 87
DRG: 501 | End: 2022-09-14
Attending: PODIATRIST
Payer: COMMERCIAL

## 2022-09-14 LAB
ANION GAP SERPL CALCULATED.3IONS-SCNC: 8 MMOL/L (ref 5–18)
BUN SERPL-MCNC: 10 MG/DL (ref 8–28)
CALCIUM SERPL-MCNC: 9.4 MG/DL (ref 8.5–10.5)
CHLORIDE BLD-SCNC: 105 MMOL/L (ref 98–107)
CO2 SERPL-SCNC: 24 MMOL/L (ref 22–31)
CREAT SERPL-MCNC: 0.85 MG/DL (ref 0.6–1.1)
GFR SERPL CREATININE-BSD FRML MDRD: 66 ML/MIN/1.73M2
GLUCOSE BLD-MCNC: 154 MG/DL (ref 70–125)
POTASSIUM BLD-SCNC: 3.8 MMOL/L (ref 3.5–5)
SODIUM SERPL-SCNC: 137 MMOL/L (ref 136–145)

## 2022-09-14 PROCEDURE — 250N000011 HC RX IP 250 OP 636: Performed by: PODIATRIST

## 2022-09-14 PROCEDURE — 97110 THERAPEUTIC EXERCISES: CPT | Mod: GO

## 2022-09-14 PROCEDURE — 250N000012 HC RX MED GY IP 250 OP 636 PS 637: Performed by: FAMILY MEDICINE

## 2022-09-14 PROCEDURE — 80048 BASIC METABOLIC PNL TOTAL CA: CPT | Performed by: INTERNAL MEDICINE

## 2022-09-14 PROCEDURE — 250N000013 HC RX MED GY IP 250 OP 250 PS 637: Performed by: FAMILY MEDICINE

## 2022-09-14 PROCEDURE — 120N000001 HC R&B MED SURG/OB

## 2022-09-14 PROCEDURE — 250N000013 HC RX MED GY IP 250 OP 250 PS 637: Performed by: HOSPITALIST

## 2022-09-14 PROCEDURE — 250N000011 HC RX IP 250 OP 636: Performed by: INTERNAL MEDICINE

## 2022-09-14 PROCEDURE — 250N000013 HC RX MED GY IP 250 OP 250 PS 637: Performed by: PODIATRIST

## 2022-09-14 PROCEDURE — 97530 THERAPEUTIC ACTIVITIES: CPT | Mod: GP

## 2022-09-14 PROCEDURE — 99232 SBSQ HOSP IP/OBS MODERATE 35: CPT | Performed by: INTERNAL MEDICINE

## 2022-09-14 RX ADMIN — HYDROXYCHLOROQUINE SULFATE 200 MG: 200 TABLET, FILM COATED ORAL at 09:06

## 2022-09-14 RX ADMIN — ENOXAPARIN SODIUM 40 MG: 100 INJECTION SUBCUTANEOUS at 13:02

## 2022-09-14 RX ADMIN — PREDNISONE 2.5 MG: 2.5 TABLET ORAL at 09:06

## 2022-09-14 RX ADMIN — LOSARTAN POTASSIUM 50 MG: 50 TABLET, FILM COATED ORAL at 09:08

## 2022-09-14 RX ADMIN — PRAMIPEXOLE DIHYDROCHLORIDE 0.5 MG: 0.25 TABLET ORAL at 20:50

## 2022-09-14 RX ADMIN — Medication 1 CAPSULE: at 09:07

## 2022-09-14 RX ADMIN — Medication 1 CAPSULE: at 20:50

## 2022-09-14 RX ADMIN — GABAPENTIN 200 MG: 100 CAPSULE ORAL at 20:50

## 2022-09-14 RX ADMIN — PANTOPRAZOLE SODIUM 40 MG: 20 TABLET, DELAYED RELEASE ORAL at 09:06

## 2022-09-14 RX ADMIN — CEFTRIAXONE SODIUM 2 G: 2 INJECTION, POWDER, FOR SOLUTION INTRAMUSCULAR; INTRAVENOUS at 13:04

## 2022-09-14 NOTE — PROGRESS NOTES
[facility] Medicine PROGRESS NOTE      Identification/Summary:   88 year old female with a PMH of CVA, HTN, KAI, GERD, asthma, PMR, RLS, Sjogren's and migraine headaches.  Underwent right Achilles tendon I&D by Dr. Chan Rodríguez.  ID consulted.  On ceftriaxone.  Surgical culture shows Citrobacter.  PICC line placed.  Awaiting TCU placement.     Recurrent right Achilles tendinitis  Status post right Achilles tendon I&D 9/6/2022  Postoperative management per podiatry.   Appreciate ID recommendations.    PICC line placed.    IV ceftriaxone until 9/19 then p.o. Omnicef for 2 weeks per ID.  Fungal cultures NGTD.  Right lower leg US negative for DVT 9/11/22.  Awaiting TCU placement.      Antibiotic associated diarrhea  Diarrhea improved after discontinued bowel regimen.   Previous Cdiff PCR on 9/9/22 negative.   Continue culturelle BID  Order scheduled loperamide 2 mg TID for 3 doses.   Continue loperamide as needed for diarrhea.      Essential hypertension  History of CVA  Denies any deficits from her stroke.  Continue losartan 50 mg daily with hold parameters.  Migraine headaches  Patient has taken Excedrin Migraine and Imitrex in the past.  No issues at this time.  Follow clinically.  Polymyalgia rheumatica  Restless leg syndrome  Continue home gabapentin 200 mg nightly, Mirapex 0.5 mg nightly and prednisone 2.5 mg daily.  Sjogren's syndrome  Continue home Plaquenil 200 mg daily  GERD  Continue home omeprazole.  Obstructive sleep apnea  Patient does not utilize CPAP.  Follow pulse oximetry and provide supplemental oxygen as needed.  Did not require supplemental oxygen overnight.  Exercise-induced asthma  9/8 had some shortness of breath.  Has used inhaler in the past but does not have one currently.  Responded well to incentive spirometry.  Albuterol MDI available to be used as needed.  .    Diet: Regular Diet Adult  Room Service  Discharge Instruction - Regular Diet Adult  Diet  DVT Prophylaxis: Lovenox  Code  "Status: Full Code    Anticipated possible discharge in few days once placement milestones are met.    Interval History/Subjective:  Doing well.  Denies any abdominal pain.  She has some loose stools 2 episodes yesterday.  No other nausea vomiting.  No shortness of breath, chest pain    Physical Exam/Objective:  Vitals I/O   Vital signs:  Temp: 98  F (36.7  C) Temp src: Oral BP: 133/80 Pulse: 77   Resp: 16 SpO2: 92 % O2 Device: None (Room air) Oxygen Delivery: 2 LPM Height: 162.6 cm (5' 4\") Weight: 90.9 kg (200 lb 4.8 oz)  Estimated body mass index is 34.38 kg/m  as calculated from the following:    Height as of this encounter: 1.626 m (5' 4\").    Weight as of this encounter: 90.9 kg (200 lb 4.8 oz). I/O last 3 completed shifts:  In: 600 [P.O.:600]  Out: 400 [Urine:400]     Body mass index is 34.38 kg/m .    General Appearance:  Alert, cooperative, no distress   Head:  Normocephalic, atraumatic   Eyes:  PERRL    Throat:  mucosa; moist   Neck: No JVD, thyromegaly   Lungs:   Clear to auscultation bilaterally, respirations unlabored   Chest Wall:  No tenderness or deformity   Heart:  Regular rate and rhythm, S1, S2 normal,no murmur   Abdomen:   Soft, non tender, non distended, bowel sounds present, no guarding or rigidity   Extremities: Right foot wrapped and in a boot   Skin: Skin color, texture, turgor normal, no rashes or lesions   Neurologic: Alert and oriented X 3, Moves all 4 extremities       Medications:   Personally Reviewed.    cefTRIAXone  2 g Intravenous Q24H     enoxaparin ANTICOAGULANT  40 mg Subcutaneous Q24H     gabapentin  200 mg Oral At Bedtime     hydroxychloroquine  200 mg Oral Daily     lactobacillus rhamnosus (GG)  1 capsule Oral BID     losartan  50 mg Oral Daily     pantoprazole  40 mg Oral QAM AC     pramipexole  0.5 mg Oral At Bedtime     predniSONE  2.5 mg Oral Daily     sodium chloride (PF)  3 mL Intracatheter Q8H       Data reviewed today: I personally reviewed all new medications, labs, " imaging/diagnostics reports over the past 24 hours. Pertinent findings include    Labs:  Most Recent 3 CBC's:Recent Labs   Lab Test 09/12/22  0651 09/09/22  0619 09/08/22  0941 09/07/22  0932 08/29/22  1546 08/01/22  1629 09/10/20  1215   WBC  --   --   --   --  9.0 8.6 7.2   HGB  --   --  11.8 11.6* 12.2 12.5 12.3   MCV  --   --   --   --  83 83 88    279  --   --  332 327 302     Most Recent 3 BMP's:Recent Labs   Lab Test 09/14/22  0928 09/08/22  0551 09/07/22  0725 09/06/22  1903 08/29/22  1546 08/01/22  1629     --   --   --  138 138   POTASSIUM 3.8  --   --   --  4.4 4.7   CHLORIDE 105  --   --   --  104 104   CO2 24  --   --   --  25 25   BUN 10  --   --   --  9.9 11.2   CR 0.85  --   --  0.89 0.85 0.83   ANIONGAP 8  --   --   --  9 9   MERCY 9.4  --   --   --  9.7 9.9   * 84 133*  --  113* 109*     Most Recent 2 LFT's:Recent Labs   Lab Test 08/29/22  1546 08/01/22  1629   AST 23 20   ALT 20 18   ALKPHOS 84 87   BILITOTAL 0.6 0.6       Imaging:   No results found for this or any previous visit (from the past 24 hour(s)).    Gladis Hester MD  Hospitalist  [facility]

## 2022-09-14 NOTE — PLAN OF CARE
Goal Outcome Evaluation:  Overall Patient Progress: improving     Pt A&Ox4, Denies pain. Dressing in place to RLE, wearing brace. TTWB. PICC patent, on IV abx till 6/19    Awaiting TCU placement

## 2022-09-14 NOTE — PROGRESS NOTES
[facility] TCU requesting full review be faxed to [phone], sent for potential available bed for Thursday 09/15/2022.    1312 Left message for Reanna at [facility] with writer's call back. Full referral sent with updated therapy notes.    1600 Patient accepted at [facility] TCU for admission Thursday 9/15/22. Patient in agreement of discharging to accepting facility. Discussed medical transportation option and costs. Patient would like to check with granddaughter to see whether she is available to transport. Care Management to follow up with patient in AM on whether granddaughter able to provide ride to TCU.

## 2022-09-14 NOTE — PLAN OF CARE
Problem: Plan of Care - These are the overarching goals to be used throughout the patient stay.    Goal: Optimal Comfort and Wellbeing  9/14/2022 1337 by Magdalena Botello RN  Outcome: Ongoing, Progressing  9/14/2022 1325 by Magdalena Botello RN  Outcome: Ongoing, Progressing     Problem: Plan of Care - These are the overarching goals to be used throughout the patient stay.    Goal: Readiness for Transition of Care  9/14/2022 1337 by Magdalena Botello RN  Outcome: Ongoing, Progressing  9/14/2022 1325 by Magdalena Botello RN  Outcome: Ongoing, Progressing   Goal Outcome Evaluation:    Patient alert and oriented, toe touch weight bearing to RLE, 1A pivot to commode. IV rocephin Q12. No protocols. BMP run today, per provider order, WDL. Single lumen PICC flushing with blood return. PICC dressing change due tomorrow, 9/15. Awaiting placement to TCU, possible bed available tomorrow, 9/15 at [facility]. Awaiting confirmation. No complaints of pain. Will continue to monitor.

## 2022-09-14 NOTE — PLAN OF CARE
Goal Outcome Evaluation:      Overall Patient Progress: improving    Outcome Evaluation: Pt A/O, pleasant. PICC patent. Denied pain. Dressing in place to RLE. Awaiting TCU placement. On immodium and culturelle for loose stools.

## 2022-09-15 VITALS
DIASTOLIC BLOOD PRESSURE: 76 MMHG | BODY MASS INDEX: 34.19 KG/M2 | TEMPERATURE: 98 F | SYSTOLIC BLOOD PRESSURE: 129 MMHG | RESPIRATION RATE: 16 BRPM | WEIGHT: 200.3 LBS | HEART RATE: 87 BPM | HEIGHT: 64 IN | OXYGEN SATURATION: 94 %

## 2022-09-15 LAB — PLATELET # BLD AUTO: 260 10E3/UL (ref 150–450)

## 2022-09-15 PROCEDURE — 250N000013 HC RX MED GY IP 250 OP 250 PS 637: Performed by: FAMILY MEDICINE

## 2022-09-15 PROCEDURE — 250N000013 HC RX MED GY IP 250 OP 250 PS 637: Performed by: PODIATRIST

## 2022-09-15 PROCEDURE — 99232 SBSQ HOSP IP/OBS MODERATE 35: CPT | Performed by: INTERNAL MEDICINE

## 2022-09-15 PROCEDURE — 85049 AUTOMATED PLATELET COUNT: CPT | Performed by: PODIATRIST

## 2022-09-15 PROCEDURE — 250N000013 HC RX MED GY IP 250 OP 250 PS 637: Performed by: HOSPITALIST

## 2022-09-15 PROCEDURE — 250N000012 HC RX MED GY IP 250 OP 636 PS 637: Performed by: FAMILY MEDICINE

## 2022-09-15 RX ORDER — CEFDINIR 300 MG/1
300 CAPSULE ORAL 2 TIMES DAILY
Qty: 28 CAPSULE | Refills: 0
Start: 2022-09-20 | End: 2022-10-04

## 2022-09-15 RX ORDER — CEFTRIAXONE 2 G/1
2 INJECTION, POWDER, FOR SOLUTION INTRAMUSCULAR; INTRAVENOUS EVERY 24 HOURS
Qty: 80 ML | Refills: 0
Start: 2022-09-15 | End: 2022-09-19

## 2022-09-15 RX ORDER — LOPERAMIDE HCL 2 MG
2 CAPSULE ORAL 4 TIMES DAILY PRN
Start: 2022-09-15

## 2022-09-15 RX ADMIN — HYDROXYCHLOROQUINE SULFATE 200 MG: 200 TABLET, FILM COATED ORAL at 09:35

## 2022-09-15 RX ADMIN — PANTOPRAZOLE SODIUM 40 MG: 20 TABLET, DELAYED RELEASE ORAL at 06:18

## 2022-09-15 RX ADMIN — Medication 1 CAPSULE: at 09:35

## 2022-09-15 RX ADMIN — PREDNISONE 2.5 MG: 2.5 TABLET ORAL at 09:35

## 2022-09-15 RX ADMIN — LOSARTAN POTASSIUM 50 MG: 50 TABLET, FILM COATED ORAL at 09:35

## 2022-09-15 ASSESSMENT — ACTIVITIES OF DAILY LIVING (ADL)
ADLS_ACUITY_SCORE: 41
ADLS_ACUITY_SCORE: 40

## 2022-09-15 NOTE — PROGRESS NOTES
Physical Therapy Discharge Summary    Reason for therapy discharge:    Discharged to transitional care facility.    Progress towards therapy goal(s). See goals on Care Plan in [facility] electronic health record for goal details.  Goals not met.  Barriers to achieving goals:   discharge from facility and weakness, TTWB status.    Therapy recommendation(s):    Continued therapy is recommended.  Rationale/Recommendations:  TCU for mobility training and strengthening.

## 2022-09-15 NOTE — PROGRESS NOTES
[facility] Medicine PROGRESS NOTE      Identification/Summary:   88 year old female with a PMH of CVA, HTN, KAI, GERD, asthma, PMR, RLS, Sjogren's and migraine headaches.  Underwent right Achilles tendon I&D by Dr. Chan Rodríguez.  ID consulted.  On ceftriaxone.  Surgical culture shows Citrobacter.  PICC line placed.  Discharge to TCU today     Recurrent right Achilles tendinitis  Status post right Achilles tendon I&D 9/6/2022  Postoperative management per podiatry.   Appreciate ID recommendations.    PICC line placed.    IV ceftriaxone until 9/19 then p.o. Omnicef for 2 weeks per ID.  Fungal cultures NGTD.  Right lower leg US negative for DVT 9/11/22.     Antibiotic associated diarrhea  Diarrhea improved after discontinued bowel regimen.   Previous Cdiff PCR on 9/9/22 negative.   Continue culturelle BID  Order scheduled loperamide 2 mg TID for 3 doses.   Continue loperamide as needed for diarrhea.      Essential hypertension  History of CVA  Denies any deficits from her stroke.  Continue losartan 50 mg daily with hold parameters.  Migraine headaches  Patient has taken Excedrin Migraine and Imitrex in the past.  No issues at this time.  Follow clinically.  Polymyalgia rheumatica  Restless leg syndrome  Continue home gabapentin 200 mg nightly, Mirapex 0.5 mg nightly and prednisone 2.5 mg daily.  Sjogren's syndrome  Continue home Plaquenil 200 mg daily  GERD  Continue home omeprazole.  Obstructive sleep apnea  Patient does not utilize CPAP.  Follow pulse oximetry and provide supplemental oxygen as needed.  Did not require supplemental oxygen overnight.  Exercise-induced asthma  9/8 had some shortness of breath.  Has used inhaler in the past but does not have one currently.  Responded well to incentive spirometry.  Albuterol MDI available to be used as needed.  .    Diet: Regular Diet Adult  Room Service  Discharge Instruction - Regular Diet Adult  Diet  DVT Prophylaxis: Lovenox  Code Status: Full  "Code    Anticipated possible discharge in few days once placement milestones are met.    Interval History/Subjective:  She has no new complaints.  Denies any shortness of breath chest pain, nausea vomiting, abdominal pain.    Physical Exam/Objective:  Vitals I/O   Vital signs:  Temp: 97.9  F (36.6  C) Temp src: Oral BP: (!) 147/68 Pulse: 80   Resp: 16 SpO2: 91 % O2 Device: None (Room air) Oxygen Delivery: 2 LPM Height: 162.6 cm (5' 4\") Weight: 90.9 kg (200 lb 4.8 oz)  Estimated body mass index is 34.38 kg/m  as calculated from the following:    Height as of this encounter: 1.626 m (5' 4\").    Weight as of this encounter: 90.9 kg (200 lb 4.8 oz). I/O last 3 completed shifts:  In: 240 [P.O.:240]  Out: 1050 [Urine:1050]     Body mass index is 34.38 kg/m .    General Appearance:  Alert, cooperative, no distress   Head:  Normocephalic, atraumatic   Eyes:  PERRL    Throat:  mucosa; moist   Neck: No JVD, thyromegaly   Lungs:   Clear to auscultation bilaterally, respirations unlabored   Chest Wall:  No tenderness or deformity   Heart:  Regular rate and rhythm, S1, S2 normal,no murmur   Abdomen:   Soft, non tender, non distended, bowel sounds present, no guarding or rigidity   Extremities: Right foot wrapped and in a boot   Skin: Skin color, texture, turgor normal, no rashes or lesions   Neurologic: Alert and oriented X 3, Moves all 4 extremities       Medications:   Personally Reviewed.    cefTRIAXone  2 g Intravenous Q24H     enoxaparin ANTICOAGULANT  40 mg Subcutaneous Q24H     gabapentin  200 mg Oral At Bedtime     hydroxychloroquine  200 mg Oral Daily     lactobacillus rhamnosus (GG)  1 capsule Oral BID     losartan  50 mg Oral Daily     pantoprazole  40 mg Oral QAM AC     pramipexole  0.5 mg Oral At Bedtime     predniSONE  2.5 mg Oral Daily     sodium chloride (PF)  3 mL Intracatheter Q8H       Data reviewed today: I personally reviewed all new medications, labs, imaging/diagnostics reports over the past 24 hours. " Pertinent findings include    Labs:  Most Recent 3 CBC's:  Recent Labs   Lab Test 09/15/22  0612 09/12/22  0651 09/09/22  0619 09/08/22  0941 09/07/22  0932 08/29/22  1546 08/01/22  1629 09/10/20  1215   WBC  --   --   --   --   --  9.0 8.6 7.2   HGB  --   --   --  11.8 11.6* 12.2 12.5 12.3   MCV  --   --   --   --   --  83 83 88    272 279  --   --  332 327 302     Most Recent 3 BMP's:  Recent Labs   Lab Test 09/14/22  0928 09/08/22  0551 09/07/22  0725 09/06/22  1903 08/29/22  1546 08/01/22  1629     --   --   --  138 138   POTASSIUM 3.8  --   --   --  4.4 4.7   CHLORIDE 105  --   --   --  104 104   CO2 24  --   --   --  25 25   BUN 10  --   --   --  9.9 11.2   CR 0.85  --   --  0.89 0.85 0.83   ANIONGAP 8  --   --   --  9 9   MERCY 9.4  --   --   --  9.7 9.9   * 84 133*  --  113* 109*     Most Recent 2 LFT's:  Recent Labs   Lab Test 08/29/22  1546 08/01/22  1629   AST 23 20   ALT 20 18   ALKPHOS 84 87   BILITOTAL 0.6 0.6       Imaging:   No results found for this or any previous visit (from the past 24 hour(s)).    Gladis Hester MD  Hospitalist  [facility]

## 2022-09-15 NOTE — PROGRESS NOTES
Care Management Discharge Note    Discharge Date: 09/15/2022       Discharge Disposition: [facility] ([facility] Transitional Care    Discharge Services:  [facility] ([facility])    Discharge Transportation: family or friend will provide    Private pay costs discussed: transportation costs    PAS Confirmation Code:  (n/a)    Patient/family educated on Medicare website which has current facility and service quality ratings: yes    Education Provided on the Discharge Plan:  AVS per bedside RN.    Persons Notified of Discharge Plans: patient    Patient/Family in Agreement with the Plan: yes    Handoff Referral Completed: Yes    Additional Information:  Chart reviewed. CM following for discharge needs. Patient has been accepted at [facility]) TCU and is agreeable to this placement. Patient states that her daughter and granddaughter are planning to transport patient to TCU. MD has written discharge orders. CM has updated [facility]) admissions regarding the discharge plan. RODERICK coordinating with MD,charge RN  [facility], patient and facility.         Neha Padilla RN

## 2022-09-15 NOTE — PROGRESS NOTES
"                  TCO Progress Note      Post-operative Day: 9 Days Post-Op    Procedure(s):  RIGHT ACHILLES TENDON INCISION, DRAINAGE AND DEBRIDEMENT      Subjective:    Pain: minimal  Chest pain, SOB:  No      Objective:  Blood pressure 129/76, pulse 87, temperature 98  F (36.7  C), temperature source Oral, resp. rate 16, height 1.626 m (5' 4\"), weight 90.9 kg (200 lb 4.8 oz), SpO2 94 %.    Patient Vitals for the past 24 hrs:   BP Temp Temp src Pulse Resp SpO2   09/15/22 0811 129/76 98  F (36.7  C) Oral 87 16 94 %   09/14/22 2359 (!) 147/68 97.9  F (36.6  C) Oral 80 16 91 %   09/14/22 1553 134/69 98  F (36.7  C) Oral 76 18 95 %       Wt Readings from Last 4 Encounters:   09/14/22 90.9 kg (200 lb 4.8 oz)   08/01/22 87.1 kg (192 lb)   08/16/20 84.8 kg (187 lb)         Motor function, sensation, and circulation intact including quadriceps, hamstrings, anterior tibialis, EHL and gastrocnemius   Yes    Wound status: incisions are clean dry and intact. Yes  Post op dressing intact. Yes  Calf tenderness: Bilateral  No    Pertinent Labs   Lab Results: personally reviewed.     Recent Labs   Lab Test 09/15/22  0612 09/14/22  0928 09/12/22  0651 09/09/22  0619 09/08/22  0941 09/07/22  0932 08/29/22  1546 08/01/22  1629 09/10/20  1215   HGB  --   --   --   --  11.8 11.6* 12.2 12.5 12.3   HCT  --   --   --   --   --   --  38.8 40.0 39.7   MCV  --   --   --   --   --   --  83 83 88     --  272 279  --   --  332 327 302   NA  --  137  --   --   --   --  138 138  --    CRP  --   --   --  2.8*  --   --  3.18 4.40 0.6       Plan: Anticoagulation protocol: ASA 81mg po q day              Pain medications: tylenol/ibuprofen for pain            Weight bearing status:  PWB            Disposition:  TCU             Daily dressing changes-adaptic, 4 x 4 gauze, kerlex and ace wrap            F/U next Wednesday at [facility] [phone]            IV abx per ID       Report completed by:  Chan Rodríguez DPM  Date: 9/15/2022  " Time: 9:52 AM

## 2022-09-15 NOTE — PLAN OF CARE
Slept well. Denied pain. Pt has questions regarding wound care and removal of stitches. Calls appropriately. PICC SL.     Problem: Plan of Care - These are the overarching goals to be used throughout the patient stay.    Goal: Readiness for Transition of Care  Outcome: Ongoing, Progressing     Problem: Pain Acute  Goal: Acceptable Pain Control and Functional Ability  Outcome: Ongoing, Progressing     Problem: Risk for Delirium  Goal: Improved Sleep  Outcome: Ongoing, Progressing   Goal Outcome Evaluation:

## 2022-09-15 NOTE — PLAN OF CARE
Problem: Plan of Care - These are the overarching goals to be used throughout the patient stay.    Goal: Plan of Care Review/Shift Note  Description: The Plan of Care Review/Shift note should be completed every shift.  The Outcome Evaluation is a brief statement about your assessment that the patient is improving, declining, or no change.  This information will be displayed automatically on your shift note.  Outcome: Ongoing, Progressing    Goal Outcome Evaluation:    Patient A & O x 3. VSS. No complaints of pain. AM medications provided. Wound care provided to right foot. Patient dresses and belonging collected. Discharge orders completed. Patient discharged to [facility] TCU.

## 2022-09-15 NOTE — PLAN OF CARE
Occupational Therapy Discharge Summary    Reason for therapy discharge:    Discharged to transitional care facility.    Progress towards therapy goal(s). See goals on Care Plan in [facility] electronic health record for goal details.  Goals partially met.  Barriers to achieving goals:   discharge from facility.    Therapy recommendation(s):    Continued therapy is recommended.  Rationale/Recommendations:  Increase IND with ADLs.

## 2022-09-15 NOTE — PLAN OF CARE
Problem: Plan of Care - These are the overarching goals to be used throughout the patient stay.    Goal: Readiness for Transition of Care  Outcome: Ongoing, Progressing   Goal Outcome Evaluation:  Pt pleasant and able to make needs known. PICC dressing changed on shift. Plan is to discharge pt to TCU in AM; family will transport but will need help getting pt into vehicle. Toe touch weight bearing on right foot.

## 2022-09-18 NOTE — DISCHARGE SUMMARY
O Discharge Summary                                       SERGIO MORENO [id number]   Age: 88 year old  PCP: Marilu Rojo, [phone] 6/25/1934     Date of Admission:  9/6/2022  Date of Discharge::  9/15/22  Discharge Physician:  Chan Rodríguez DPM    Code status:  Prior    Admission Information:  Admission Diagnosis:  Musculoskeletal disorder [M79.9]  S/P foot surgery [Z98.890]  Achilles tendon infection [M65.10]    Post-Operative Day: 12 Days Post-Op     Reason for admission:  The patient was admitted for the following:Procedure(s) (LRB):  RIGHT ACHILLES TENDON INCISION, DRAINAGE AND DEBRIDEMENT (Right)    Principal Problem:    S/P foot surgery  Active Problems:    Polymyalgia rheumatica (H)    Mild intermittent asthma    Hypertension    Achilles tendon infection    Migraine headache      Allergies:  Augmentin, Amlodipine, Ciprofloxacin, Doxycycline, Duloxetine, Levofloxacin, Nitrofurantoin, Phenothiazines, Strawberry, Sulfa (sulfonamide antibiotics) [sulfa drugs], Walnuts [black walnut pollen allergy skin test], and Morphine    Following the procedure noted above the patient was transferred to the post-op floor and started on:    Therapy:  yes  Anticoagulation Plan: ASA 81mg q day  Pain Management: ibuprofen and tylenol as needed  Weight bearing status:  TTWB      The patient was followed and co-managed by the hospitalist service during the inpatient treatment course  Complications: none  Consultations:  Hospitalist, ID     Pertinent Labs   Lab Results: personally reviewed.     Recent Labs   Lab Test 09/15/22  0612 09/14/22  0928 09/12/22  0651 09/09/22  0619 09/08/22  0941 09/07/22  0932 08/29/22  1546 08/01/22  1629 09/10/20  1215   HGB  --   --   --   --  11.8 11.6* 12.2 12.5 12.3   HCT  --   --   --   --   --   --  38.8 40.0 39.7   MCV  --   --   --   --   --   --  83 83 88     --  272 279  --   --  332 327 302   NA  --  137  --   --   --   --  138 138  --    CRP  --   --   --   2.8*  --   --  3.18 4.40 0.6          Discharge Information:  Condition at discharge: stable  Discharge destination:  TCU     Medications at discharge:  Discharge Medication List as of 9/15/2022 10:03 AM      START taking these medications    Details   acetaminophen (TYLENOL) 325 MG tablet Take 3 tablets (975 mg) by mouth every 8 hours Take for the next 7 to 10 days as prescribed to provide you with optimal pain control. Alternate with your pain medication for better relief., Disp-90 tablet, R-0, Local Print      aspirin (ASA) 81 MG EC tablet Take 1 tablet (81 mg) by mouth daily for 30 days Take 1 aspirin once a day for 30 days to prevent blood clots. Take with food. Do not take at same time as other anti-inflammatories, like ibuprofen or celebrex., Disp-30 tablet, R-0, Local Print      cefdinir (OMNICEF) 300 MG capsule Take 1 capsule (300 mg) by mouth 2 times daily for 14 days, Disp-28 capsule, R-0, No Print Out      cefTRIAXone (ROCEPHIN) 2 GM vial Inject 2 g into the vein every 24 hours for 4 days, Disp-80 mL, R-0, No Print Out      loperamide (IMODIUM) 2 MG capsule Take 1 capsule (2 mg) by mouth 4 times daily as needed for diarrhea, No Print Out         CONTINUE these medications which have NOT CHANGED    Details   aspirin-acetaminophen-caffeine (EXCEDRIN MIGRAINE) 250-250-65 mg per tablet [ASPIRIN-ACETAMINOPHEN-CAFFEINE (EXCEDRIN MIGRAINE) 250-250-65 MG PER TABLET] Take 1 tablet by mouth every 6 (six) hours as needed for pain., Historical      cholecalciferol, vitamin D3, (VITAMIN D3 ORAL) [CHOLECALCIFEROL, VITAMIN D3, (VITAMIN D3 ORAL)] Take 4,000 Units by mouth daily. , Historical      gabapentin (NEURONTIN) 100 MG capsule Take 200 mg by mouth At Bedtime, Historical      hydroxychloroquine (PLAQUENIL) 200 MG tablet Take 200 mg by mouth daily, Historical      losartan (COZAAR) 50 MG tablet Take 50 mg by mouth daily, Historical      omeprazole (PRILOSEC) 20 MG capsule [OMEPRAZOLE (PRILOSEC) 20 MG CAPSULE] Take  20 mg by mouth daily before breakfast., Historical      pramipexole (MIRAPEX) 0.25 MG tablet Take 0.5 mg by mouth At Bedtime, Historical      predniSONE (DELTASONE) 2.5 MG tablet Take 2.5 mg by mouth daily, Historical      SUMAtriptan (IMITREX) 50 MG tablet Take 50 mg by mouth every 2 hours as needed for migraine, Historical         STOP taking these medications       cephALEXin (KEFLEX) 500 MG capsule Comments:   Reason for Stopping:                          Follow-Up Care:  Patient should be seen in a TCO office in 5-7  days by the patient's Orthopedic Surgeon/Physician Assistant.  Call [phone] for appointment or questions.    Chan Rodríguez DPM

## 2022-09-28 ENCOUNTER — OFFICE VISIT (OUTPATIENT)
Dept: INFECTIOUS DISEASES | Facility: CLINIC | Age: 87
End: 2022-09-28
Payer: COMMERCIAL

## 2022-09-28 VITALS
TEMPERATURE: 98.6 F | OXYGEN SATURATION: 94 % | HEART RATE: 93 BPM | DIASTOLIC BLOOD PRESSURE: 72 MMHG | SYSTOLIC BLOOD PRESSURE: 122 MMHG

## 2022-09-28 DIAGNOSIS — M76.61 ACHILLES TENDINITIS OF RIGHT LOWER EXTREMITY: Primary | ICD-10-CM

## 2022-09-28 PROCEDURE — 99214 OFFICE O/P EST MOD 30 MIN: CPT | Performed by: INTERNAL MEDICINE

## 2022-09-28 NOTE — PROGRESS NOTES
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         Plan:     IV ceftriaxone completed 9/19,   then PO omnicef x 2 weks until October 3    No further recommendations  Follow-up with Dr. Marcos Berry M.D.          Present Illness:     This is a 88 years old female with chronic Achilles infectious tendinitis right side  She had debridement and excision of tendon in the hospital  She was then discharged on IV ceftriaxone until the 19th at TCU.  She was discharged today on Omnicef as planned.  She had some diarrhea that resolved with yogurt  No fevers  Is much less pain  She is now able to put pressure on the foot  She is podiatry today Dr. Rodríguez who removed stitches and everything looked fine    Review of Systems  Performed and all negative except as mentioned above.    Past medical, family, and social history reviewed, unchanged from before.        Physical Exam:     Gen. appearance nontoxic in wheelchair  Eyes no conjunctivitis or icterus  Neck no stiffness  Heart  edema  Lungs clear  Abdomen soft not tender  Extremities dressed- examined by podiatry today  Skin  no rash   Neurologic alert oriented no focal deficits      DATA   No results found for any visits on 09/28/22.      Nat Berry MD

## 2022-10-05 LAB — BACTERIA WND CULT: NO GROWTH

## 2022-11-10 ENCOUNTER — TRANSCRIBE ORDERS (OUTPATIENT)
Dept: VASCULAR SURGERY | Facility: CLINIC | Age: 87
End: 2022-11-10

## 2022-11-10 DIAGNOSIS — M86.9: Primary | ICD-10-CM

## 2022-11-30 ENCOUNTER — OFFICE VISIT (OUTPATIENT)
Dept: VASCULAR SURGERY | Facility: CLINIC | Age: 87
End: 2022-11-30
Attending: PODIATRIST
Payer: COMMERCIAL

## 2022-11-30 ENCOUNTER — TELEPHONE (OUTPATIENT)
Dept: VASCULAR SURGERY | Facility: CLINIC | Age: 87
End: 2022-11-30

## 2022-11-30 VITALS
HEART RATE: 80 BPM | RESPIRATION RATE: 16 BRPM | DIASTOLIC BLOOD PRESSURE: 78 MMHG | BODY MASS INDEX: 31.32 KG/M2 | SYSTOLIC BLOOD PRESSURE: 126 MMHG | HEIGHT: 65 IN | WEIGHT: 188 LBS | TEMPERATURE: 98.6 F

## 2022-11-30 DIAGNOSIS — I73.9 PAD (PERIPHERAL ARTERY DISEASE) (H): ICD-10-CM

## 2022-11-30 DIAGNOSIS — L97.313: Primary | ICD-10-CM

## 2022-11-30 PROCEDURE — 99203 OFFICE O/P NEW LOW 30 MIN: CPT | Mod: 25 | Performed by: PODIATRIST

## 2022-11-30 PROCEDURE — G0463 HOSPITAL OUTPT CLINIC VISIT: HCPCS

## 2022-11-30 PROCEDURE — 11043 DBRDMT MUSC&/FSCA 1ST 20/<: CPT | Performed by: PODIATRIST

## 2022-11-30 ASSESSMENT — PAIN SCALES - GENERAL: PAINLEVEL: MILD PAIN (2)

## 2022-11-30 NOTE — TELEPHONE ENCOUNTER
Orders for NEENA from Dr. Woodruff sent to [facility] in [place] per patient request. FAX: [phone]

## 2022-11-30 NOTE — PATIENT INSTRUCTIONS
Important lnstructions    Dr. Woodruff would like you to start drinking 2 protein shakes per day such as Ensure.      WEIGHT BEARING STATUS: You are to remain NON WEIGHT BEARING on your right foot. NON WEIGHT BEARING MEANS NO PRESSURE ON YOUR FOOT OR HEEL AT ANY TIME FOR ANY REASON!    2. OFFLOADING DEVICE: Must use a A WHEELCHAIR at all times! (do not use affected foot to push wheelchair)    3. STABILIZATION DEVICE: Use a CAM BOOT . You will need to Wear this anytime you are up and out of bed, it is okay to remove when you are sleeping..       4. ELEVATE: Elevating your leg means laying with your head on a pillow and your foot ABOVE YOUR WAIST.     5. DO NOT MOVE YOUR FOOT.  There is a risk of worsening the wound or incision. To give yourself a higher chance of healing, please DO NOT swing foot back and forth and wiggle foot/toes especially when inside a stabilization device.        Dressing Change lnstructions                 Start a wound vac on your right achilles wounds per instructions below:  - Wound Vac Instructions    1. 3x weekly and as needed cleanse the area with NS    2. Pat dry    3. Apply Cavilon no sting barrier wipe to the skin surrounding the wound to protect from drainage/maceration    4. Apply drape around incision. Cut strip of drape and apply to skin if bridging is needed; plan this area in advance; should not be over bony prominence     5. Cut the foam to fit the area of the incision    6. Cut narrow strip of foam if bridging    7. Cover foam with drape to obtain air tight seal    8. Cut opening the size of a quarter for where the suction pad will be applied    9. Apply Suction pad    10. 125mmHG suction continuous    [employer] [facility] can be reached at [phone], 24 hours a day 7 days a week     - Back up plan in place:     If the negative pressure wound therapy malfunctions or unable to maintain seal: dressing must be removed and reapplied within 2 hours of the incident. If unable to  reapply negative pressure wound dressing, place Normal Saline moistened gauze in wound bed and cover with appropriate dressing to keep wound bed moist.  Change wet-to-dry dressing two times a day until healthcare staff can re-implement negative pressure therapy. Change canister at least weekly.  [facility] Contact Center can be reached at [phone], 24 hours a day 7 days a week    Information on Vacuum-Assisted Closure of a Wound  Vacuum-assisted closure (VAC) of a wound is a type of treatment to help wounds heal. It s also known as negative pressure wound therapy. During the treatment, a device lowers air pressure on the wound. This can help the wound heal more quickly.  Understanding the wound VAC system  A wound VAC system has several parts. A foam or gauze dressing is put directly on the wound. The dressing is changed every 24 to 72 hours. An adhesive film covers and seals the dressing and wound. A drainage tube leads from under the adhesive film and connects to a portable vacuum pump. This pump removes air pressure over the wound. It may do this constantly. Or it may do it in cycles. During the treatment, you ll need to carry the portable pump everywhere you go.  Why wound VAC is used  You might need this therapy for a recent traumatic wound. Or you may need it for a chronic wound. This is a wound that does not heal the way it should over time. This can happen with wounds in people who have diabetes. You may need a wound VAC if you ve had a recent skin graft. And you may need a wound VAC for a large wound. Large wounds can take a longer time to heal.  A wound vacuum system may help your wound heal more quickly by:  Draining extra fluid from the wound  Reducing swelling  Reducing bacteria in the wound  Keeping your wound moist and warm  Helping draw together wound edges  Increasing blood flow to your wound  Decreasing inflammation  Wound VAC offers some other advantages over other types of wound care. It may  decrease your overall discomfort. The dressings usually need to be changed less often. And they may be easier to keep in position.  Risks of wound VAC  Wound VAC has some rare risks, such as:  Bleeding (which may be severe)  Wound infection  An abnormal connection between the intestinal tract and the skin (enteric fistula)  Proper training in dressing changes can help reduce the risk for these complications. Also, your doctor will carefully evaluate you to make sure you are a good candidate for the therapy. Certain problems can increase your risk for complications. These include:  Exposed organs or blood vessels  High risk of bleeding from another medical problem  Wound infection  Nearby bone infection  Dead wound tissue  Cancer tissue  Fragile skin, such as from aging or longtime use of topical steroids  Allergy to adhesive  Very poor blood flow to your wound  Wounds close to joints that may reopen because of movement  Your doctor will discuss the risks that apply to you. Make sure to talk with him or her about all of your questions and concerns.  Getting ready for wound VAC  You likely won t need to do much to get ready for wound VAC. In some cases, you may need to wait a while before having this therapy. For example, your doctor may first need to treat an infection in your wound. Dead or damaged tissue may also need to be removed from your wound.  You or a caregiver may need training on how to use the wound VAC device. This is done if you will be able to have your wound vacuum therapy at home. In other cases, you may need to have your wound vacuum therapy in a health care facility.  On the day of your procedure  A health care provider will cover your wound with foam or gauze wound dressing. An adhesive film will be put over the dressing and wound. This seals the wound. The foam connects to a drainage tube, which leads to a vacuum pump. This pump is portable. When the pump is turned on, it draws fluid through the  foam and out the drainage tubing. The pump may run constantly, or it may cycle off and on. Your exact setup will depend on the specific type of wound vacuum system that you use.  Managing your wound  You may need the dressing changed about once a day. You may need it changed more or less often, depending on your wound. You or your caregiver may be trained to do this at home. Or it may be done by a visiting health care provider. Your doctor may prescribe a pain medicine. This is to prevent or reduce pain during the dressing change.  You will likely need to use the wound VAC system for several weeks or months. During this time, you ll carry the portable pump everywhere you go.  Nutrition for wound healing  During this time, make sure you follow a healthy diet. This is needed so the wound can heal and to prevent infection. Your doctor can tell you more about what to include in your diet during this time.  follow up with your doctor if you have a medical condition that led to your wound, such as diabetes. He or she can help you prevent future wounds.  Follow-up care  Your doctor will carefully keep track of your healing. Make sure to keep all follow-up appointments.  When to call your health care provider  Call your health care provider right away if you have any of these:  Fever of 100.4 F (38.0 C) or higher  Increased redness, swelling, or warmth around wound  Increased pain  Bright red blood or blood clots in tubing or the collection chamber of the vacuum        It IS NOT ok to get your wound wet in the bath or shower    SEEK MEDICAL CARE IF:  You have an increase in swelling, pain, or redness around the wound.  You have an increase in the amount of pus coming from the wound.  There is a bad smell coming from the wound.  The wound appears to be worsening/enlarging  You have a fever greater than 101.5 F      It is ok to continue current wound care treatment/products for the next 2-3 days until new wound care supplies  are ordered and arrive. If longer than this please contact our office at [phone].        We want to hear from you!   In the next few weeks, you should receive a call or email to complete a survey about your visit at [facility]. Please help us improve your appointment experience by letting us know how we did today. We strive to make your experience good and value any ways in which we could do better.      We value your input and suggestions.    Thank you for choosing the [facility]!

## 2022-11-30 NOTE — PROGRESS NOTES
"      FOOT AND ANKLE SURGERY/PODIATRY CONSULT NOTE        ASSESSMENT: Ulcerations right ankle       TREATMENT:  -I discussed with the patient and her granddaughter today that both of the left ankle ulcers probe to deep tissues, more significant along the proximal ulcer.     -Due to the increased depth, I recommend we begin use of a wound vac at this time. We discussed that additional surgical I&D may be necessary if the wounds do not improve.     -Referred for a wheelchair to remain non-weight bearing.     -Referred for a CAM boot. Recommend floating of the posterior right ankle ulcers.     -Referred for NEENA's.     -After discussion of risk factors and consent obtained 2% Lidocaine HCL jelly was applied, under clean conditions, the right and ankle ulceration(s) were debrided using .into the muscle/ fascia.  Devitalized and nonviable tissue, along with any fibrin and slough, was removed to improve granulation tissue formation, stimulate wound healing, decrease overall bacteria load, disrupt biofilm formation and decrease edge senescence. Wound drainage was scant No. Total excisional debridement was 2 sq cm into the muscle/fascia with a depth of 3 cm.   Ulcers were improved afterwards and .  Measures were as noted on the flow sheet. Wounds were packed with 1/4\" gauze and a gauze dressing which home healthcare will continue to apply until the wound vac is applied.     -She will follow-up in 2 weeks.    Mayco Woodruff DPM  [facility]      HPI: Joyce Brina was seen today at the request of Dr. Cuevas for chronic wounds along the posterior left ankle. The patient states she initially underwent repair of an achilles tendon four years ago, and has had intermitted development of wounds along the posterior left ankle. She most recently underwent I&D of the ulcers on 9/6/22. Finished course of antibiotics per ID. Currently walking with a walker. Lives alone in an apartment. Denies smoking. "     Past Medical History:   Diagnosis Date     Acid reflux      Anemia      Arthritis      BPPV (benign paroxysmal positional vertigo)      Cerebral artery occlusion with cerebral infarction (H)      Dyspnea on exertion      History of anesthesia complications      History of blood transfusion      Hypertension      Lung nodules      Migraine headache 9/6/2022     Mild intermittent asthma      Polymyalgia rheumatica (H)      PONV (postoperative nausea and vomiting)      Restless leg syndrome      Sjogren's syndrome (H)      Sleep apnea      TIA (transient ischemic attack) 01/01/2012       Past Surgical History:   Procedure Laterality Date     ANKLE SURGERY       BLADDER SURGERY       HYSTERECTOMY       INCISION AND DRAINAGE FOOT, COMBINED Right 9/6/2022    Procedure: RIGHT ACHILLES TENDON INCISION, DRAINAGE AND DEBRIDEMENT;  Surgeon: Chan Rodríguez DPM;  Location: [facility] OR     INCISION AND DRAINAGE OF WOUND Right 8/13/2020    Procedure: INCISION AND DRAINAGE ACHILLES TENDON, RIGHT ANKLE;  Surgeon: Chan Rodríguez DPM;  Location: [facility] Main OR;  Service: Podiatry     JOINT REPLACEMENT Bilateral     hips and knees     PICC  8/16/2020          PICC SINGLE LUMEN PLACEMENT  9/8/2022          RELEASE CARPAL TUNNEL       TONSILLECTOMY          Allergies   Allergen Reactions     Amoxicillin-Pot Clavulanate Unknown     Augmentin      Amlodipine Diarrhea     Ciprofloxacin Other (See Comments)     Tendon injury     Doxycycline Nausea and Vomiting     Duloxetine Nausea     Levofloxacin Nausea and Vomiting     Nitrofurantoin Nausea and Vomiting     Phenothiazines Other (See Comments)     Worsening of restless legs     Strawberry Hives     Sulfa (Sulfonamide Antibiotics) [Sulfa Drugs] Unknown     Walnuts [Black [place] Pollen Allergy Skin Test] Hives     Morphine Rash         Current Outpatient Medications:      acetaminophen (TYLENOL) 325 MG tablet, Take 3 tablets (975 mg) by mouth every 8 hours Take for the next 7  "to 10 days as prescribed to provide you with optimal pain control. Alternate with your pain medication for better relief., Disp: 90 tablet, Rfl: 0     aspirin-acetaminophen-caffeine (EXCEDRIN MIGRAINE) 250-250-65 mg per tablet, [ASPIRIN-ACETAMINOPHEN-CAFFEINE (EXCEDRIN MIGRAINE) 250-250-65 MG PER TABLET] Take 1 tablet by mouth every 6 (six) hours as needed for pain., Disp: , Rfl:      cholecalciferol, vitamin D3, (VITAMIN D3 ORAL), [CHOLECALCIFEROL, VITAMIN D3, (VITAMIN D3 ORAL)] Take 4,000 Units by mouth daily. , Disp: , Rfl:      gabapentin (NEURONTIN) 100 MG capsule, Take 200 mg by mouth At Bedtime, Disp: , Rfl:      hydroxychloroquine (PLAQUENIL) 200 MG tablet, Take 200 mg by mouth daily, Disp: , Rfl:      losartan (COZAAR) 50 MG tablet, Take 50 mg by mouth daily, Disp: , Rfl:      omeprazole (PRILOSEC) 20 MG capsule, [OMEPRAZOLE (PRILOSEC) 20 MG CAPSULE] Take 20 mg by mouth daily before breakfast., Disp: , Rfl:      pramipexole (MIRAPEX) 0.25 MG tablet, Take 0.5 mg by mouth At Bedtime, Disp: , Rfl:      predniSONE (DELTASONE) 2.5 MG tablet, Take 2.5 mg by mouth daily, Disp: , Rfl:      loperamide (IMODIUM) 2 MG capsule, Take 1 capsule (2 mg) by mouth 4 times daily as needed for diarrhea (Patient not taking: Reported on 11/30/2022), Disp: , Rfl:      SUMAtriptan (IMITREX) 50 MG tablet, Take 50 mg by mouth every 2 hours as needed for migraine (Patient not taking: Reported on 11/30/2022), Disp: , Rfl:     Social History     Social History Narrative     Not on file       Family History   Problem Relation Age of Onset     Breast Cancer Mother        Review of Systems - 10 point Review of Systems is negative except for right ankle ulcers which is noted in HPI.      OBJECTIVE:  Appearance: alert, well appearing, and in no distress.    /78   Pulse 80   Temp 98.6  F (37  C)   Resp 16   Ht 5' 4.5\" (1.638 m)   Wt 188 lb (85.3 kg)   BMI 31.77 kg/m      BMI= Body mass index is 31.77 kg/m .    General appearance: " Patient is alert and fully cooperative with history & exam.  No sign of distress is noted during the visit.     Psychiatric: Affect is pleasant & appropriate.  Patient appears motivated to improve health.     Respiratory: Breathing is regular & unlabored while sitting.     HEENT: Hearing is intact to spoken word.  Speech is clear.  No gross evidence of visual impairment that would impact ambulation.    Vascular: Dorsalis pedis non-palpableRight.  Dermatologic:   VASC Wound right achilles proximal (Active)   Pre Size Length 1.2 11/30/22 1130   Pre Size Width 0.5 11/30/22 1130   Pre Size Depth 3 11/30/22 1130   Pre Total Sq cm 0.6 11/30/22 1130       VASC Wound right achilles distal (Active)   Pre Size Length 1.2 11/30/22 1130   Pre Size Width 0.8 11/30/22 1130   Pre Size Depth 0.2 11/30/22 1130   Pre Total Sq cm 0.96 11/30/22 1130       Incision/Surgical Site with Packing 09/06/22 Distal;Right;Posterior Calf Qty Placed: 1 (Active)       Incision/Surgical Site 09/06/22 Right Leg (Active)   Increased depth at both posterior left ankle ulcers, proximal ulceration has significant depth, no erythema.   Neurologic: Intact to light touch Right.  Musculoskeletal: Contracted digits noted Right.

## 2022-12-01 ENCOUNTER — TELEPHONE (OUTPATIENT)
Dept: VASCULAR SURGERY | Facility: CLINIC | Age: 87
End: 2022-12-01

## 2022-12-01 DIAGNOSIS — L97.313: Primary | ICD-10-CM

## 2022-12-01 NOTE — TELEPHONE ENCOUNTER
Patient's granddaughter, Tasha, calling on patient's behalf to request orders for an over the toilet commode so patient can avoid putting weight on her foot. She would also like to know what the process is for obtaining the wheelchair that was ordered as well.    [phone]   
Spoke with Tasha and gave her the phone number for [facility].     
day(s)/4

## 2022-12-02 ENCOUNTER — TELEPHONE (OUTPATIENT)
Dept: VASCULAR SURGERY | Facility: CLINIC | Age: 87
End: 2022-12-02

## 2022-12-02 NOTE — TELEPHONE ENCOUNTER
Spoke with granddaughter, they are wanting to get pt to a TCU. Advised only way to admit to TCU is from a hospital setting. Family is considering bring pt to ER for admission. Advised that pt would need to meet criteria for admission, so if pt is stable, admission would be unlikely.     They are going to try and get more nursing services for pt.    Also spoke about w/c, can go to any other DME store, like [facility].  Grand daughter has the order.     Alena Mendoza LPN on 12/2/2022 at 11:07 AM

## 2022-12-02 NOTE — TELEPHONE ENCOUNTER
Spoke with Tiffanie from home care.  She states if there is a problem with the wound vac, the patient is unable to reach her foot to do anything with it.  Discussed with home care nurse that due to the depth of the wound the patient needs the wound vac.  The granddaughter is coming over to the patient's residence during the day and would like to see how things go before possibly moving patient to a higher level of care.      Alena DUARTE ordered more vac dressing kits from [facility].  Did tell Tiffanie that they can also call the 800 number for [employer] that can be found on the vac and inside the hard black case to order supplies when they need them as well.    No other questions/concerns.

## 2022-12-02 NOTE — TELEPHONE ENCOUNTER
Pt's granddaughter called stating that the medical supply locations that she was directed to call to obtain a wheelchair don't provide wheelchairs.  They went to the 3rd floor [facility] DME location as well and they were told they can't get a wheelchair through them.  She would like a call back with some direction on where to go to get a wheelchair.    Best number to reach her is [phone].

## 2022-12-02 NOTE — TELEPHONE ENCOUNTER
Tiffanie from [facility] calling with a couple questions.  She would like a call back to discuss.    Patient has a wound vac that she is not able to manage, she is wondering how to go about getting her into a TCU.  Also wondering if the supplies have been ordered and what the ETA is.        Tiffanie from [facility] can be reached: [phone]

## 2022-12-07 ENCOUNTER — TELEPHONE (OUTPATIENT)
Dept: VASCULAR SURGERY | Facility: CLINIC | Age: 87
End: 2022-12-07

## 2022-12-07 NOTE — TELEPHONE ENCOUNTER
Kathy with [facility] would like to know if it is OK for patient to bend their right hip and knee for leg kicks; patient is non-weight bearing and in a CAM boot.   [phone]

## 2022-12-08 NOTE — TELEPHONE ENCOUNTER
Per Dr. Woodruff, needs to keep leg still. NOT ok for leg/knee kicks. Kathy notified.      Alena Mendoza LPN on 12/8/2022 at 12:43 PM

## 2022-12-14 ENCOUNTER — ANCILLARY PROCEDURE (OUTPATIENT)
Dept: VASCULAR ULTRASOUND | Facility: CLINIC | Age: 87
End: 2022-12-14
Attending: PODIATRIST
Payer: COMMERCIAL

## 2022-12-14 ENCOUNTER — OFFICE VISIT (OUTPATIENT)
Dept: VASCULAR SURGERY | Facility: CLINIC | Age: 87
End: 2022-12-14
Attending: PODIATRIST
Payer: COMMERCIAL

## 2022-12-14 ENCOUNTER — TELEPHONE (OUTPATIENT)
Dept: VASCULAR SURGERY | Facility: CLINIC | Age: 87
End: 2022-12-14

## 2022-12-14 VITALS
OXYGEN SATURATION: 95 % | BODY MASS INDEX: 31.32 KG/M2 | HEART RATE: 80 BPM | WEIGHT: 188 LBS | SYSTOLIC BLOOD PRESSURE: 120 MMHG | DIASTOLIC BLOOD PRESSURE: 68 MMHG | HEIGHT: 65 IN | RESPIRATION RATE: 16 BRPM

## 2022-12-14 DIAGNOSIS — L97.313: Primary | ICD-10-CM

## 2022-12-14 DIAGNOSIS — I73.9 PAD (PERIPHERAL ARTERY DISEASE) (H): ICD-10-CM

## 2022-12-14 PROCEDURE — 93923 UPR/LXTR ART STDY 3+ LVLS: CPT

## 2022-12-14 PROCEDURE — 11043 DBRDMT MUSC&/FSCA 1ST 20/<: CPT | Performed by: PODIATRIST

## 2022-12-14 RX ORDER — RIVAROXABAN 20 MG/1
20 TABLET, FILM COATED ORAL DAILY
COMMUNITY
Start: 2022-11-26

## 2022-12-14 RX ORDER — CEPHALEXIN 500 MG/1
1 CAPSULE ORAL
COMMUNITY
Start: 2022-11-15

## 2022-12-14 RX ORDER — RIVAROXABAN 15 MG/1
TABLET, FILM COATED ORAL
COMMUNITY
Start: 2022-11-26

## 2022-12-14 ASSESSMENT — PAIN SCALES - GENERAL: PAINLEVEL: NO PAIN (0)

## 2022-12-14 NOTE — PROGRESS NOTES
FOOT AND ANKLE SURGERY/PODIATRY Progress Note      ASSESSMENT: Ulcerations right ankle       TREATMENT:  -I discussed with the patient that the depth of the proximal wound on the right leg has improved, but continues to probe to deep tissues. We discussed that due to the small overall size of the wound, the wound vac may not have the space to increase granulation tissue. Discussed delayed primary closure, she would will consider but would like to avoid surgery at this time.     -I recommend TCU if she decides to pursue surgical option.     -Continue wound vac at both locations and non-weight bearing.     -After discussion of risk factors and consent obtained 2% Lidocaine HCL jelly was applied, under clean conditions, the right and ankle ulceration(s) were debrided using currette.  Devitalized and nonviable tissue, along with any fibrin and slough, was removed to improve granulation tissue formation, stimulate wound healing, decrease overall bacteria load, disrupt biofilm formation and decrease edge senescence. Wound drainage was scant No. Total excisional debridement was 1 sq cm into the muscle/fascia with a depth of 2 cm.   Ulcers were improved afterwards and .  Measures were as noted on the flow sheet. A gauze dressing was applied.      -She will follow-up in 2 weeks.    Mayco Woodruff DPM  [facility]      HPI: Joyce Brian was seen again today for right ankle ulcers. She has used the wound vac and admits bearing weight on her right foot.       Past Medical History:   Diagnosis Date     Acid reflux      Anemia      Arthritis      BPPV (benign paroxysmal positional vertigo)      Cerebral artery occlusion with cerebral infarction (H)      Dyspnea on exertion      History of anesthesia complications      History of blood transfusion      Hypertension      Lung nodules      Migraine headache 9/6/2022     Mild intermittent asthma      Polymyalgia rheumatica (H)      PONV (postoperative  nausea and vomiting)      Restless leg syndrome      Sjogren's syndrome (H)      Sleep apnea      TIA (transient ischemic attack) 01/01/2012       Past Surgical History:   Procedure Laterality Date     ANKLE SURGERY       BLADDER SURGERY       HYSTERECTOMY       INCISION AND DRAINAGE FOOT, COMBINED Right 9/6/2022    Procedure: RIGHT ACHILLES TENDON INCISION, DRAINAGE AND DEBRIDEMENT;  Surgeon: Chan Rodríguez DPM;  Location: [facility]     INCISION AND DRAINAGE OF WOUND Right 8/13/2020    Procedure: INCISION AND DRAINAGE ACHILLES TENDON, RIGHT ANKLE;  Surgeon: Chan Rodríguez DPM;  Location: [facility];  Service: Podiatry     JOINT REPLACEMENT Bilateral     hips and knees     PICC  8/16/2020          PICC SINGLE LUMEN PLACEMENT  9/8/2022          RELEASE CARPAL TUNNEL       TONSILLECTOMY         Allergies   Allergen Reactions     Amoxicillin-Pot Clavulanate Unknown     Augmentin      Amlodipine Diarrhea     Ciprofloxacin Other (See Comments)     Tendon injury     Doxycycline Nausea and Vomiting     Duloxetine Nausea     Levofloxacin Nausea and Vomiting     Nitrofurantoin Nausea and Vomiting     Phenothiazines Other (See Comments)     Worsening of restless legs     Strawberry Hives     Sulfa (Sulfonamide Antibiotics) [Sulfa Drugs] Unknown     Walnuts [[place] Pollen Allergy Skin Test] Hives     Morphine Rash         Current Outpatient Medications:      acetaminophen (TYLENOL) 325 MG tablet, Take 3 tablets (975 mg) by mouth every 8 hours Take for the next 7 to 10 days as prescribed to provide you with optimal pain control. Alternate with your pain medication for better relief., Disp: 90 tablet, Rfl: 0     aspirin-acetaminophen-caffeine (EXCEDRIN MIGRAINE) 250-250-65 mg per tablet, [ASPIRIN-ACETAMINOPHEN-CAFFEINE (EXCEDRIN MIGRAINE) 250-250-65 MG PER TABLET] Take 1 tablet by mouth every 6 (six) hours as needed for pain., Disp: , Rfl:      cephALEXin (KEFLEX) 500 MG capsule, Take 1 capsule by mouth,  "Disp: , Rfl:      cholecalciferol, vitamin D3, (VITAMIN D3 ORAL), [CHOLECALCIFEROL, VITAMIN D3, (VITAMIN D3 ORAL)] Take 4,000 Units by mouth daily. , Disp: , Rfl:      gabapentin (NEURONTIN) 100 MG capsule, Take 200 mg by mouth At Bedtime, Disp: , Rfl:      hydroxychloroquine (PLAQUENIL) 200 MG tablet, Take 200 mg by mouth daily, Disp: , Rfl:      loperamide (IMODIUM) 2 MG capsule, Take 1 capsule (2 mg) by mouth 4 times daily as needed for diarrhea, Disp: , Rfl:      losartan (COZAAR) 50 MG tablet, Take 50 mg by mouth daily, Disp: , Rfl:      omeprazole (PRILOSEC) 20 MG capsule, [OMEPRAZOLE (PRILOSEC) 20 MG CAPSULE] Take 20 mg by mouth daily before breakfast., Disp: , Rfl:      polyethylene glycol-propylene glycol (SYSTANE ULTRA) 0.4-0.3 % SOLN ophthalmic solution, 1-2 drops, Disp: , Rfl:      pramipexole (MIRAPEX) 0.25 MG tablet, Take 0.5 mg by mouth At Bedtime, Disp: , Rfl:      predniSONE (DELTASONE) 2.5 MG tablet, Take 2.5 mg by mouth daily, Disp: , Rfl:      SUMAtriptan (IMITREX) 50 MG tablet, Take 50 mg by mouth every 2 hours as needed for migraine, Disp: , Rfl:      XARELTO ANTICOAGULANT 15 MG TABS tablet, TAKE ONE TABLET BY MOUTH TWICE DAILY for 21 days, Disp: , Rfl:      XARELTO ANTICOAGULANT 20 MG TABS tablet, Take 20 mg by mouth daily, Disp: , Rfl:     Review of Systems - 10 point Review of Systems is negative except for right foot ulcers which is noted in HPI.      OBJECTIVE:  /68   Pulse 80   Resp 16   Ht 5' 4.5\" (1.638 m)   Wt 188 lb (85.3 kg)   SpO2 95%   BMI 31.77 kg/m    General appearance: Patient is alert and fully cooperative with history & exam.  No sign of distress is noted during the visit.    Vascular: Dorsalis pedis non-palpableRight.  Dermatologic:    VASC Wound right achilles proximal (Active)   Pre Size Length 0.5 12/14/22 1335   Pre Size Width 0.2 12/14/22 1335   Pre Size Depth 2 12/14/22 1335   Pre Total Sq cm 0.1 12/14/22 1335       VASC Wound right achilles distal (Active) "   Pre Size Length 0.9 12/14/22 1335   Pre Size Width 0.3 12/14/22 1335   Pre Size Depth 0.5 12/14/22 1335   Pre Total Sq cm 0.27 12/14/22 1335       Incision/Surgical Site with Packing 09/06/22 Distal;Right;Posterior Calf Qty Placed: 1 (Active)       Incision/Surgical Site 09/06/22 Right Leg (Active)   Increased depth at both right posterior ulcers, 2 cm at proximal ulcer. No erythema right lower extremity.  Neurologic: Intact to light touch Right.  Musculoskeletal: Contracted digits noted Right.    Imaging:     No results found.       Picture:

## 2022-12-14 NOTE — PATIENT INSTRUCTIONS
Important lnstructions    Dr. Woodruff would like you to start drinking 2 protein shakes per day such as Ensure.      WEIGHT BEARING STATUS: You are to remain NON WEIGHT BEARING on your right foot. NON WEIGHT BEARING MEANS NO PRESSURE ON YOUR FOOT OR HEEL AT ANY TIME FOR ANY REASON!    2. OFFLOADING DEVICE: Must use a A WHEELCHAIR at all times! (do not use affected foot to push wheelchair)    3. STABILIZATION DEVICE: Use a CAM BOOT . You will need to Wear this anytime you are up and out of bed, it is okay to remove when you are sleeping..       4. ELEVATE: Elevating your leg means laying with your head on a pillow and your foot ABOVE YOUR WAIST.     5. DO NOT MOVE YOUR FOOT.  There is a risk of worsening the wound or incision. To give yourself a higher chance of healing, please DO NOT swing foot back and forth and wiggle foot/toes especially when inside a stabilization device.        Dressing Change lnstructions                 Start a wound vac on your right achilles wounds per instructions below:  - Wound Vac Instructions    1. 3x weekly and as needed cleanse the area with NS    2. Pat dry    3. Apply Cavilon no sting barrier wipe to the skin surrounding the wound to protect from drainage/maceration    4. Apply drape around incision. Cut strip of drape and apply to skin if bridging is needed; plan this area in advance; should not be over bony prominence     5. Cut the foam to fit the area of the incision (proximal is probing ~2cm)     6. Cut narrow strip of foam if bridging    7. Cover foam with drape to obtain air tight seal    8. Cut opening the size of a quarter for where the suction pad will be applied    9. Apply Suction pad    10. 125mmHG suction continuous    [employer] Contact Center can be reached at [phone], 24 hours a day 7 days a week     - Back up plan in place:     If the negative pressure wound therapy malfunctions or unable to maintain seal: dressing must be removed and reapplied within 2 hours of  the incident. If unable to reapply negative pressure wound dressing, place Normal Saline moistened gauze in wound bed and cover with appropriate dressing to keep wound bed moist.  Change wet-to-dry dressing two times a day until healthcare staff can re-implement negative pressure therapy. Change canister at least weekly.  [facility] Contact Center can be reached at [phone], 24 hours a day 7 days a week    Information on Vacuum-Assisted Closure of a Wound  Vacuum-assisted closure (VAC) of a wound is a type of treatment to help wounds heal. It s also known as negative pressure wound therapy. During the treatment, a device lowers air pressure on the wound. This can help the wound heal more quickly.  Understanding the wound VAC system  A wound VAC system has several parts. A foam or gauze dressing is put directly on the wound. The dressing is changed every 24 to 72 hours. An adhesive film covers and seals the dressing and wound. A drainage tube leads from under the adhesive film and connects to a portable vacuum pump. This pump removes air pressure over the wound. It may do this constantly. Or it may do it in cycles. During the treatment, you ll need to carry the portable pump everywhere you go.  Why wound VAC is used  You might need this therapy for a recent traumatic wound. Or you may need it for a chronic wound. This is a wound that does not heal the way it should over time. This can happen with wounds in people who have diabetes. You may need a wound VAC if you ve had a recent skin graft. And you may need a wound VAC for a large wound. Large wounds can take a longer time to heal.  A wound vacuum system may help your wound heal more quickly by:  Draining extra fluid from the wound  Reducing swelling  Reducing bacteria in the wound  Keeping your wound moist and warm  Helping draw together wound edges  Increasing blood flow to your wound  Decreasing inflammation  Wound VAC offers some other advantages over other types  of wound care. It may decrease your overall discomfort. The dressings usually need to be changed less often. And they may be easier to keep in position.  Risks of wound VAC  Wound VAC has some rare risks, such as:  Bleeding (which may be severe)  Wound infection  An abnormal connection between the intestinal tract and the skin (enteric fistula)  Proper training in dressing changes can help reduce the risk for these complications. Also, your doctor will carefully evaluate you to make sure you are a good candidate for the therapy. Certain problems can increase your risk for complications. These include:  Exposed organs or blood vessels  High risk of bleeding from another medical problem  Wound infection  Nearby bone infection  Dead wound tissue  Cancer tissue  Fragile skin, such as from aging or longtime use of topical steroids  Allergy to adhesive  Very poor blood flow to your wound  Wounds close to joints that may reopen because of movement  Your doctor will discuss the risks that apply to you. Make sure to talk with him or her about all of your questions and concerns.  Getting ready for wound VAC  You likely won t need to do much to get ready for wound VAC. In some cases, you may need to wait a while before having this therapy. For example, your doctor may first need to treat an infection in your wound. Dead or damaged tissue may also need to be removed from your wound.  You or a caregiver may need training on how to use the wound VAC device. This is done if you will be able to have your wound vacuum therapy at home. In other cases, you may need to have your wound vacuum therapy in a health care facility.  On the day of your procedure  A health care provider will cover your wound with foam or gauze wound dressing. An adhesive film will be put over the dressing and wound. This seals the wound. The foam connects to a drainage tube, which leads to a vacuum pump. This pump is portable. When the pump is turned on, it  draws fluid through the foam and out the drainage tubing. The pump may run constantly, or it may cycle off and on. Your exact setup will depend on the specific type of wound vacuum system that you use.  Managing your wound  You may need the dressing changed about once a day. You may need it changed more or less often, depending on your wound. You or your caregiver may be trained to do this at home. Or it may be done by a visiting health care provider. Your doctor may prescribe a pain medicine. This is to prevent or reduce pain during the dressing change.  You will likely need to use the wound VAC system for several weeks or months. During this time, you ll carry the portable pump everywhere you go.  Nutrition for wound healing  During this time, make sure you follow a healthy diet. This is needed so the wound can heal and to prevent infection. Your doctor can tell you more about what to include in your diet during this time.  follow up with your doctor if you have a medical condition that led to your wound, such as diabetes. He or she can help you prevent future wounds.  Follow-up care  Your doctor will carefully keep track of your healing. Make sure to keep all follow-up appointments.  When to call your health care provider  Call your health care provider right away if you have any of these:  Fever of 100.4 F (38.0 C) or higher  Increased redness, swelling, or warmth around wound  Increased pain  Bright red blood or blood clots in tubing or the collection chamber of the vacuum        It IS NOT ok to get your wound wet in the bath or shower    SEEK MEDICAL CARE IF:  You have an increase in swelling, pain, or redness around the wound.  You have an increase in the amount of pus coming from the wound.  There is a bad smell coming from the wound.  The wound appears to be worsening/enlarging  You have a fever greater than 101.5 F      It is ok to continue current wound care treatment/products for the next 2-3 days until  new wound care supplies are ordered and arrive. If longer than this please contact our office at [phone].        We want to hear from you!   In the next few weeks, you should receive a call or email to complete a survey about your visit at [facility]. Please help us improve your appointment experience by letting us know how we did today. We strive to make your experience good and value any ways in which we could do better.      We value your input and suggestions.    Thank you for choosing the [facility]!

## 2022-12-14 NOTE — TELEPHONE ENCOUNTER
Kathy calling to clarify the wound care orders for this patient.  She states the patient was unclear on the recommendation of bandages/wraps. Kathy also needs to know the frequency this patient needs to be seen by home care and when the next visit needs to be completed.  She would like a call back asap: [phone]

## 2022-12-28 ENCOUNTER — TELEPHONE (OUTPATIENT)
Dept: VASCULAR SURGERY | Facility: CLINIC | Age: 87
End: 2022-12-28

## 2022-12-28 NOTE — TELEPHONE ENCOUNTER
Spoke with Tiffanie from [facility].  She will reapply wound vac as wound vac is being used to assist wound in filling in and not for managing drainage.

## 2022-12-28 NOTE — TELEPHONE ENCOUNTER
Tiffanie is calling from [employer] stating there hasn't been any output from the wound vac the last few days.  They have left the wound vac intact. If you have questions she can be reached at [phone]

## 2022-12-29 ENCOUNTER — OFFICE VISIT (OUTPATIENT)
Dept: VASCULAR SURGERY | Facility: CLINIC | Age: 87
End: 2022-12-29
Attending: PODIATRIST
Payer: COMMERCIAL

## 2022-12-29 VITALS
HEIGHT: 65 IN | OXYGEN SATURATION: 97 % | WEIGHT: 188 LBS | RESPIRATION RATE: 16 BRPM | BODY MASS INDEX: 31.32 KG/M2 | HEART RATE: 82 BPM | SYSTOLIC BLOOD PRESSURE: 118 MMHG | DIASTOLIC BLOOD PRESSURE: 72 MMHG

## 2022-12-29 DIAGNOSIS — L97.313: Primary | ICD-10-CM

## 2022-12-29 PROCEDURE — 11042 DBRDMT SUBQ TIS 1ST 20SQCM/<: CPT | Performed by: PODIATRIST

## 2022-12-29 ASSESSMENT — PAIN SCALES - GENERAL: PAINLEVEL: NO PAIN (0)

## 2022-12-29 NOTE — PROGRESS NOTES
FOOT AND ANKLE SURGERY/PODIATRY Progress Note      ASSESSMENT: Ulcerations right ankle       TREATMENT:  -I discussed with the patient that the right ankle ulcers have improved since her last visit. I recommend she hold the wound vac and begin use of endoform.     -After discussion of risk factors and consent obtained 2% Lidocaine HCL jelly was applied, under clean conditions, the right and ankle ulceration(s) were debrided using currette.  Devitalized and nonviable tissue, along with any fibrin and slough, was removed to improve granulation tissue formation, stimulate wound healing, decrease overall bacteria load, disrupt biofilm formation and decrease edge senescence. Wound drainage was scant No. Total excisional debridement was 0.4 sq cm into the subcutaneous tissue with a depth of 0.2 cm.   Ulcers were improved afterwards and .  Measures were as noted on the flow sheet. Collagen with a gauze dresssing was applied. She will continue to apply Collagen with a gauze dresssing as directed.    -She will follow-up in 2 weeks.    Mayco Woodruff DPM  [facility]      HPI: Joyce HUERTA Roc was seen again today for ulcers along the posterior right ankle. She has used the wound vac as directed.       Past Medical History:   Diagnosis Date     Acid reflux      Anemia      Arthritis      BPPV (benign paroxysmal positional vertigo)      Cerebral artery occlusion with cerebral infarction (H)      Dyspnea on exertion      History of anesthesia complications      History of blood transfusion      Hypertension      Lung nodules      Migraine headache 9/6/2022     Mild intermittent asthma      Polymyalgia rheumatica (H)      PONV (postoperative nausea and vomiting)      Restless leg syndrome      Sjogren's syndrome (H)      Sleep apnea      TIA (transient ischemic attack) 01/01/2012       Past Surgical History:   Procedure Laterality Date     ANKLE SURGERY       BLADDER SURGERY       HYSTERECTOMY        INCISION AND DRAINAGE FOOT, COMBINED Right 9/6/2022    Procedure: RIGHT ACHILLES TENDON INCISION, DRAINAGE AND DEBRIDEMENT;  Surgeon: Chan Rodríguez DPM;  Location: [facility]     INCISION AND DRAINAGE OF WOUND Right 8/13/2020    Procedure: INCISION AND DRAINAGE ACHILLES TENDON, RIGHT ANKLE;  Surgeon: Chan Rodríguez DPM;  Location: [facility] OR;  Service: Podiatry     JOINT REPLACEMENT Bilateral     hips and knees     PICC  8/16/2020          PICC SINGLE LUMEN PLACEMENT  9/8/2022          RELEASE CARPAL TUNNEL       TONSILLECTOMY         Allergies   Allergen Reactions     Amoxicillin-Pot Clavulanate Unknown     Augmentin      Amlodipine Diarrhea     Ciprofloxacin Other (See Comments)     Tendon injury     Doxycycline Nausea and Vomiting     Duloxetine Nausea     Levofloxacin Nausea and Vomiting     Nitrofurantoin Nausea and Vomiting     Phenothiazines Other (See Comments)     Worsening of restless legs     Strawberry Hives     Sulfa (Sulfonamide Antibiotics) [Sulfa Drugs] Unknown     Walnuts [Black [place] Pollen Allergy Skin Test] Hives     Morphine Rash         Current Outpatient Medications:      acetaminophen (TYLENOL) 325 MG tablet, Take 3 tablets (975 mg) by mouth every 8 hours Take for the next 7 to 10 days as prescribed to provide you with optimal pain control. Alternate with your pain medication for better relief., Disp: 90 tablet, Rfl: 0     aspirin-acetaminophen-caffeine (EXCEDRIN MIGRAINE) 250-250-65 mg per tablet, [ASPIRIN-ACETAMINOPHEN-CAFFEINE (EXCEDRIN MIGRAINE) 250-250-65 MG PER TABLET] Take 1 tablet by mouth every 6 (six) hours as needed for pain., Disp: , Rfl:      cephALEXin (KEFLEX) 500 MG capsule, Take 1 capsule by mouth, Disp: , Rfl:      cholecalciferol, vitamin D3, (VITAMIN D3 ORAL), [CHOLECALCIFEROL, VITAMIN D3, (VITAMIN D3 ORAL)] Take 4,000 Units by mouth daily. , Disp: , Rfl:      gabapentin (NEURONTIN) 100 MG capsule, Take 200 mg by mouth At Bedtime, Disp: , Rfl:       "hydroxychloroquine (PLAQUENIL) 200 MG tablet, Take 200 mg by mouth daily, Disp: , Rfl:      loperamide (IMODIUM) 2 MG capsule, Take 1 capsule (2 mg) by mouth 4 times daily as needed for diarrhea, Disp: , Rfl:      losartan (COZAAR) 50 MG tablet, Take 50 mg by mouth daily, Disp: , Rfl:      omeprazole (PRILOSEC) 20 MG capsule, [OMEPRAZOLE (PRILOSEC) 20 MG CAPSULE] Take 20 mg by mouth daily before breakfast., Disp: , Rfl:      polyethylene glycol-propylene glycol (SYSTANE ULTRA) 0.4-0.3 % SOLN ophthalmic solution, 1-2 drops, Disp: , Rfl:      pramipexole (MIRAPEX) 0.25 MG tablet, Take 0.5 mg by mouth At Bedtime, Disp: , Rfl:      predniSONE (DELTASONE) 2.5 MG tablet, Take 2.5 mg by mouth daily, Disp: , Rfl:      SUMAtriptan (IMITREX) 50 MG tablet, Take 50 mg by mouth every 2 hours as needed for migraine, Disp: , Rfl:      XARELTO ANTICOAGULANT 15 MG TABS tablet, TAKE ONE TABLET BY MOUTH TWICE DAILY for 21 days, Disp: , Rfl:      XARELTO ANTICOAGULANT 20 MG TABS tablet, Take 20 mg by mouth daily, Disp: , Rfl:     Review of Systems - 10 point Review of Systems is negative except for right ankle ulcers which is noted in HPI.      OBJECTIVE:  /72   Pulse 82   Resp 16   Ht 5' 4.5\" (1.638 m)   Wt 188 lb (85.3 kg)   SpO2 97%   BMI 31.77 kg/m    General appearance: Patient is alert and fully cooperative with history & exam.  No sign of distress is noted during the visit.    Vascular: Dorsalis pedis non-palpableRight.  Dermatologic:    VASC Wound right achilles proximal (Active)   Pre Size Length 0.5 12/14/22 1335   Pre Size Width 0.2 12/14/22 1335   Pre Size Depth 2 12/14/22 1335   Pre Total Sq cm 0.1 12/14/22 1335   Post Size Length 0.1 12/29/22 1517   Post Size Width 0.1 12/29/22 1517   Post Size Depth 0.2 12/29/22 1517   Post Total Sq cm 0.01 12/29/22 1517   Description SCAB 12/29/22 1517       VASC Wound right achilles distal (Active)   Pre Size Length 1 12/29/22 1517   Pre Size Width 0.3 12/29/22 1517   Pre " Size Depth 0.2 12/29/22 1517   Pre Total Sq cm 0.3 12/29/22 1517       Incision/Surgical Site with Packing 09/06/22 Distal;Right;Posterior Calf Qty Placed: 1 (Active)       Incision/Surgical Site 09/06/22 Right Leg (Active)   Granular base right posterior ankle ulcers x2, no erythema.   Neurologic: Intact to light touch Right.  Musculoskeletal: Contracted digits noted Right.    Imaging:     US Low Ext Arterial Doppler  wo Ex    Result Date: 12/14/2022  Table formatting from the original result was not included. BILATERAL RESTING ANKLE-BRACHIAL INDICES (NEENA'S) (Date: 12/14/22) Indication: Right non healing wound, HX of achilles repair, decreased lower extremity pulses Previous: none History: Hypertension, TIA/Stroke and Vascular Ulcers  Resting NEENA's          Right: mmHg Index     Brachial: 156  Ankle-(PT): 180 1.15 Ankle-(DP): 181 1.16          Digit: 99 0.63               Left: mmHg Index     Brachial: 145  Ankle-(PT): 171 1.10 Ankle-(DP): 151 0.97          Digit: 137 0.88 Resting ankle-brachial index of 1.16 on the right. Toe Pressures of 99 mmHg and TBI of 0.63 Resting ankle-brachial index of 1.10 on the left. Toe Pressures of 137 mmHg and TBI of 0.88  VPR WAVEFORMS: The right volume plethysmography waveforms are mildly abnormal at the lower thigh level, mildly abnormal at the upper calf level and mildly abnormal at the ankle. The left volume plethysmography waveforms are mildly abnormal at the lower thigh level, mildly abnormal at the upper calf level and mildly abnormal at the ankle.  Impression:  1. RIGHT LOWER EXTREMITY: NEENA is Normal with multiphasic waveforms with an NEENA of 1.16. and DBI is Abnormal but potentially adequate for wound healing with toe pressures of 99 mmHg. 2. LEFT LOWER EXTREMITY: NEENA is Normal with multiphasic waveforms with an NEENA of 1.1. and DBI is Normal and adequate for wound healing with toe pressures of 137 mmHg. Reference: Wound classification Grade NEENA Ankle Systolic Pressure Toe  Pressures 0 > 0.80 > 100 mmHg > 60 mmHg 1 0.6 - 0.79 70 - 100 mmHg 40 - 59 mmHg 2 0.4 - 0.59 50-70 mmHg 30 - 39 mmHg 3 < 0.39 < 50 mmHg < 30 mmHg Digit Pressures DBI Disease Category > 0.70 Normal < 0.70 Abnormal > 30 mmHg Potential wound healing < 30 mmHg Impaired wound healing Ankle Brachial Pressures NEENA Disease Category > 1.3  Likely vessel calcification with monophasic waveforms, non-diagnostic 0.95-1.30 Normal with multiphasic waveforms 0.50-0.95 Single level disease 0.30-0.50 Multilevel disease < 0.30 Critical limb ischema Volume Plethysmography Recording (VPR) at all levels Normal Sharp systolic peak, fast upstroke, prominent dicrotic notch in wave Mild Sharp systolic peak, fast upstroke, absent dicrotic notch in wave Moderate Flattened systolic peak, slowed upstroke, absent dicrotic notch inwave Severe amplitude wave with = upslope and down slope [place]          Picture:

## 2022-12-29 NOTE — PATIENT INSTRUCTIONS
Important lnstructions    HOLD YOUR WOUND VAC    WEIGHT BEARING STATUS: You are to remain NON WEIGHT BEARING on your right foot. NON WEIGHT BEARING MEANS NO PRESSURE ON YOUR FOOT OR HEEL AT ANY TIME FOR ANY REASON!    2. OFFLOADING DEVICE: Must use a A WHEELCHAIR at all times! (do not use affected foot to push wheelchair)    3. STABILIZATION DEVICE: Use a CAM BOOT . You will need to Wear this anytime you are up and out of bed, it is okay to remove when you are sleeping..       4. ELEVATE: Elevating your leg means laying with your head on a pillow and your foot ABOVE YOUR WAIST.     5. DO NOT MOVE YOUR FOOT.  There is a risk of worsening the wound or incision. To give yourself a higher chance of healing, please DO NOT swing foot back and forth and wiggle foot/toes especially when inside a stabilization device.        Dressing Change lnstructions        - Dressing Application of  Endoform    1. Endoform should be cut to the size of the wound.  It should touch the edges of the ulcer. It is okay if it overlap the edges a small amount.  Then, moisten the Endoform with saline.(If it is easier for you, you can moisten it before laying it in the wound)    2. Cover the wound with Endoform, followed by dry gauze, and secure with roll gauze if needed.     3. Endoform is naturally used by the body over time so you may not find it in the wound when you change your dressing.  If you do find some, gently cleanse the wound with saline. Do not remove the remaining Endoform, which may appear as an off-white to ho gel, just add Endoform on top.  Usually, more Endoform will need to be added every 5-7 days.     4. Change your top dressing every 1-2 days or as needed depending on drainage.    -Endoform is a collagen dressing created from ovine (sheep) fore-stomach tissue. The collagen extracellular matrix transforms into a soft conforming sheet, which is naturally incorporated into the wound over time.  Collagen dressings are used to  stimulate wound healing.   ,     It IS NOT ok to get your wound wet in the bath or shower    SEEK MEDICAL CARE IF:  You have an increase in swelling, pain, or redness around the wound.  You have an increase in the amount of pus coming from the wound.  There is a bad smell coming from the wound.  The wound appears to be worsening/enlarging  You have a fever greater than 101.5 F      It is ok to continue current wound care treatment/products for the next 2-3 days until new wound care supplies are ordered and arrive. If longer than this please contact our office at [phone].        We want to hear from you!   In the next few weeks, you should receive a call or email to complete a survey about your visit at [facility]. Please help us improve your appointment experience by letting us know how we did today. We strive to make your experience good and value any ways in which we could do better.      We value your input and suggestions.    Thank you for choosing the [facility]!

## 2023-01-11 ENCOUNTER — OFFICE VISIT (OUTPATIENT)
Dept: VASCULAR SURGERY | Facility: CLINIC | Age: 88
End: 2023-01-11
Attending: PODIATRIST
Payer: COMMERCIAL

## 2023-01-11 VITALS
HEART RATE: 85 BPM | DIASTOLIC BLOOD PRESSURE: 82 MMHG | BODY MASS INDEX: 31.32 KG/M2 | SYSTOLIC BLOOD PRESSURE: 150 MMHG | RESPIRATION RATE: 16 BRPM | HEIGHT: 65 IN | WEIGHT: 188 LBS | OXYGEN SATURATION: 97 %

## 2023-01-11 DIAGNOSIS — L97.313: Primary | ICD-10-CM

## 2023-01-11 PROCEDURE — G0463 HOSPITAL OUTPT CLINIC VISIT: HCPCS | Performed by: PODIATRIST

## 2023-01-11 PROCEDURE — 99212 OFFICE O/P EST SF 10 MIN: CPT | Performed by: PODIATRIST

## 2023-01-11 ASSESSMENT — PAIN SCALES - GENERAL: PAINLEVEL: NO PAIN (0)

## 2023-01-11 NOTE — PATIENT INSTRUCTIONS
You wound is healed!    Continue to wear your CAM boot for the next 2 weeks, than you can switch back to regular shoes.

## 2023-01-11 NOTE — PROGRESS NOTES
FOOT AND ANKLE SURGERY/PODIATRY Progress Note      ASSESSMENT: Ulcerations right ankle       TREATMENT:  -The right ankle ulcerations have resolved.     -I recommend she remain limited walking in the CAM boot x2 weeks, then resume use of regular shoes.     -I have asked her to monitor for any skin irritation or color changes.     -She is discharged from my care at this time but encouraged to return as concerns develop.     Mayco Woodruff DPM  [facility]      HPI: Joyce Brian was seen again today for right ankle ulcers. She has remained mostly non-weight bearing on the right foot as directed.       Past Medical History:   Diagnosis Date     Acid reflux      Anemia      Arthritis      BPPV (benign paroxysmal positional vertigo)      Cerebral artery occlusion with cerebral infarction (H)      Dyspnea on exertion      History of anesthesia complications      History of blood transfusion      Hypertension      Lung nodules      Migraine headache 9/6/2022     Mild intermittent asthma      Polymyalgia rheumatica (H)      PONV (postoperative nausea and vomiting)      Restless leg syndrome      Sjogren's syndrome (H)      Sleep apnea      TIA (transient ischemic attack) 01/01/2012       Past Surgical History:   Procedure Laterality Date     ANKLE SURGERY       BLADDER SURGERY       HYSTERECTOMY       INCISION AND DRAINAGE FOOT, COMBINED Right 9/6/2022    Procedure: RIGHT ACHILLES TENDON INCISION, DRAINAGE AND DEBRIDEMENT;  Surgeon: Chan Rodríguez DPM;  Location: [facility]     INCISION AND DRAINAGE OF WOUND Right 8/13/2020    Procedure: INCISION AND DRAINAGE ACHILLES TENDON, RIGHT ANKLE;  Surgeon: Chan Rodríguez DPM;  Location: [facility];  Service: Podiatry     JOINT REPLACEMENT Bilateral     hips and knees     PICC  8/16/2020          PICC SINGLE LUMEN PLACEMENT  9/8/2022          RELEASE CARPAL TUNNEL       TONSILLECTOMY         Allergies   Allergen Reactions     Amoxicillin-Pot  Clavulanate Unknown     Augmentin      Amlodipine Diarrhea     Ciprofloxacin Other (See Comments)     Tendon injury     Doxycycline Nausea and Vomiting     Duloxetine Nausea     Levofloxacin Nausea and Vomiting     Nitrofurantoin Nausea and Vomiting     Phenothiazines Other (See Comments)     Worsening of restless legs     Strawberry Hives     Sulfa (Sulfonamide Antibiotics) [Sulfa Drugs] Unknown     Walnuts [Black [place] Pollen Allergy Skin Test] Hives     Morphine Rash         Current Outpatient Medications:      acetaminophen (TYLENOL) 325 MG tablet, Take 3 tablets (975 mg) by mouth every 8 hours Take for the next 7 to 10 days as prescribed to provide you with optimal pain control. Alternate with your pain medication for better relief., Disp: 90 tablet, Rfl: 0     aspirin-acetaminophen-caffeine (EXCEDRIN MIGRAINE) 250-250-65 mg per tablet, [ASPIRIN-ACETAMINOPHEN-CAFFEINE (EXCEDRIN MIGRAINE) 250-250-65 MG PER TABLET] Take 1 tablet by mouth every 6 (six) hours as needed for pain., Disp: , Rfl:      cephALEXin (KEFLEX) 500 MG capsule, Take 1 capsule by mouth, Disp: , Rfl:      cholecalciferol, vitamin D3, (VITAMIN D3 ORAL), [CHOLECALCIFEROL, VITAMIN D3, (VITAMIN D3 ORAL)] Take 4,000 Units by mouth daily. , Disp: , Rfl:      gabapentin (NEURONTIN) 100 MG capsule, Take 200 mg by mouth At Bedtime, Disp: , Rfl:      hydroxychloroquine (PLAQUENIL) 200 MG tablet, Take 200 mg by mouth daily, Disp: , Rfl:      loperamide (IMODIUM) 2 MG capsule, Take 1 capsule (2 mg) by mouth 4 times daily as needed for diarrhea, Disp: , Rfl:      losartan (COZAAR) 50 MG tablet, Take 50 mg by mouth daily, Disp: , Rfl:      omeprazole (PRILOSEC) 20 MG capsule, [OMEPRAZOLE (PRILOSEC) 20 MG CAPSULE] Take 20 mg by mouth daily before breakfast., Disp: , Rfl:      polyethylene glycol-propylene glycol (SYSTANE ULTRA) 0.4-0.3 % SOLN ophthalmic solution, 1-2 drops, Disp: , Rfl:      pramipexole (MIRAPEX) 0.25 MG tablet, Take 0.5 mg by mouth At  "Bedtime, Disp: , Rfl:      predniSONE (DELTASONE) 2.5 MG tablet, Take 2.5 mg by mouth daily, Disp: , Rfl:      SUMAtriptan (IMITREX) 50 MG tablet, Take 50 mg by mouth every 2 hours as needed for migraine, Disp: , Rfl:      XARELTO ANTICOAGULANT 15 MG TABS tablet, TAKE ONE TABLET BY MOUTH TWICE DAILY for 21 days, Disp: , Rfl:      XARELTO ANTICOAGULANT 20 MG TABS tablet, Take 20 mg by mouth daily, Disp: , Rfl:     Review of Systems - 10 point Review of Systems is negative except for right ankle ulcers which is noted in HPI.      OBJECTIVE:  BP (!) 150/82   Pulse 85   Resp 16   Ht 5' 4.5\" (1.638 m)   Wt 188 lb (85.3 kg)   SpO2 97%   BMI 31.77 kg/m    General appearance: Patient is alert and fully cooperative with history & exam.  No sign of distress is noted during the visit.    Vascular: Dorsalis pedis non-palpableRight.  Dermatologic:    VASC Wound right achilles proximal (Active)   Pre Size Length 0.5 12/14/22 1335   Pre Size Width 0.2 12/14/22 1335   Pre Size Depth 2 12/14/22 1335   Pre Total Sq cm 0.1 12/14/22 1335   Post Size Length 0 01/11/23 1500   Post Size Width 0 01/11/23 1500   Post Size Depth 0 01/11/23 1500   Post Total Sq cm 0 01/11/23 1500   Description SCAB 12/29/22 1517       VASC Wound right achilles distal (Active)   Pre Size Length 1 12/29/22 1517   Pre Size Width 0.3 12/29/22 1517   Pre Size Depth 0.2 12/29/22 1517   Pre Total Sq cm 0.3 12/29/22 1517   Post Size Length 0 01/11/23 1500   Post Size Width 0 01/11/23 1500   Post Size Depth 0 01/11/23 1500   Post Total Sq cm 0 01/11/23 1500   Description scab 01/11/23 1456       Incision/Surgical Site with Packing 09/06/22 Distal;Right;Posterior Calf Qty Placed: 1 (Active)       Incision/Surgical Site 09/06/22 Right Leg (Active)     Neurologic: Intact to light touch Right.  Musculoskeletal: Contracted digits noted Right.    Imaging:     US Low Ext Arterial Doppler  wo Ex    Result Date: 12/14/2022  Table formatting from the original result was " not included. BILATERAL RESTING ANKLE-BRACHIAL INDICES (NEENA'S) (Date: 12/14/22) Indication: Right non healing wound, HX of achilles repair, decreased lower extremity pulses Previous: none History: Hypertension, TIA/Stroke and Vascular Ulcers  Resting NEENA's          Right: mmHg Index     Brachial: 156  Ankle-(PT): 180 1.15 Ankle-(DP): 181 1.16          Digit: 99 0.63               Left: mmHg Index     Brachial: 145  Ankle-(PT): 171 1.10 Ankle-(DP): 151 0.97          Digit: 137 0.88 Resting ankle-brachial index of 1.16 on the right. Toe Pressures of 99 mmHg and TBI of 0.63 Resting ankle-brachial index of 1.10 on the left. Toe Pressures of 137 mmHg and TBI of 0.88  VPR WAVEFORMS: The right volume plethysmography waveforms are mildly abnormal at the lower thigh level, mildly abnormal at the upper calf level and mildly abnormal at the ankle. The left volume plethysmography waveforms are mildly abnormal at the lower thigh level, mildly abnormal at the upper calf level and mildly abnormal at the ankle.  Impression:  1. RIGHT LOWER EXTREMITY: NEENA is Normal with multiphasic waveforms with an NEENA of 1.16. and DBI is Abnormal but potentially adequate for wound healing with toe pressures of 99 mmHg. 2. LEFT LOWER EXTREMITY: NEENA is Normal with multiphasic waveforms with an NEENA of 1.1. and DBI is Normal and adequate for wound healing with toe pressures of 137 mmHg. Reference: Wound classification Grade NEENA Ankle Systolic Pressure Toe Pressures 0 > 0.80 > 100 mmHg > 60 mmHg 1 0.6 - 0.79 70 - 100 mmHg 40 - 59 mmHg 2 0.4 - 0.59 50-70 mmHg 30 - 39 mmHg 3 < 0.39 < 50 mmHg < 30 mmHg Digit Pressures DBI Disease Category > 0.70 Normal < 0.70 Abnormal > 30 mmHg Potential wound healing < 30 mmHg Impaired wound healing Ankle Brachial Pressures NEENA Disease Category > 1.3  Likely vessel calcification with monophasic waveforms, non-diagnostic 0.95-1.30 Normal with multiphasic waveforms 0.50-0.95 Single level disease 0.30-0.50 Multilevel  disease < 0.30 Critical limb ischema Volume Plethysmography Recording (VPR) at all levels Normal Sharp systolic peak, fast upstroke, prominent dicrotic notch in wave Mild Sharp systolic peak, fast upstroke, absent dicrotic notch in wave Moderate Flattened systolic peak, slowed upstroke, absent dicrotic notch inwave Severe amplitude wave with = upslope and down slope [place]          Picture:

## 2023-01-19 ENCOUNTER — TELEPHONE (OUTPATIENT)
Dept: VASCULAR SURGERY | Facility: CLINIC | Age: 88
End: 2023-01-19
Payer: COMMERCIAL

## 2023-01-19 NOTE — TELEPHONE ENCOUNTER
Spoke to Joyce. Her hip is sore and she thinks she is off balance with the boot. Discussed that she should continue to wear the boot as much as possible until 1/25 but limit her walking. Take Tylenol for pain/discomfort as needed.

## 2023-01-19 NOTE — TELEPHONE ENCOUNTER
Caller: Laury    Provider: MD Mayco Woodruff    Detailed reason for call: Laury is calling stating after wearing the boot for a week her hip is in pain. She is wondering if she could limit her walking and not wear the boot.    Best phone number to contact: [phone]    Best time to contact: anytime    Ok to leave a detailed message: Yes    Ok to speak to authorized person if needed: No      (Noted to patient if reason is related to wound or incision, to please send a photo via email or [employer].)

## (undated) DEVICE — SUCTION IRR SYSTEM W/O TIP INTERPULSE HANDPIECE 0210-100-000

## (undated) DEVICE — BANDAGE ESMARK 4 X 3 YARDS STL 23578-143

## (undated) DEVICE — SUCTION MANIFOLD NEPTUNE 2 SYS 4 PORT 0702-020-000

## (undated) DEVICE — SOL WATER IRRIG 1000ML BOTTLE 2F7114

## (undated) DEVICE — SOLUTION IRRIG 2B7127 .9NS 3000ML BAG

## (undated) DEVICE — DRSG ADAPTIC 3X3" 6112

## (undated) DEVICE — SOL NACL 0.9% IRRIG 1000ML BOTTLE 2F7124

## (undated) DEVICE — PACKING IODOFORM STRIP 1/4" 7831

## (undated) DEVICE — DRSG GAUZE 4X4" TRAY 6939

## (undated) DEVICE — GLOVE BIOGEL PI SZ 7.5 40875

## (undated) DEVICE — SU ETHILON 3-0 PS-2 18" 1669H

## (undated) DEVICE — DRAPE STERI TOWEL LG 1010

## (undated) DEVICE — CUSTOM PACK LOWER EXTREMITY SOP5BLEHEA

## (undated) DEVICE — PLATE GROUNDING ADULT W/CORD 9165L

## (undated) DEVICE — BONE CLEANING TIP INTERPULSE  0210-010-000

## (undated) RX ORDER — PROPOFOL 10 MG/ML
INJECTION, EMULSION INTRAVENOUS
Status: DISPENSED
Start: 2022-09-06

## (undated) RX ORDER — ONDANSETRON 2 MG/ML
INJECTION INTRAMUSCULAR; INTRAVENOUS
Status: DISPENSED
Start: 2022-09-06

## (undated) RX ORDER — FENTANYL CITRATE-0.9 % NACL/PF 10 MCG/ML
PLASTIC BAG, INJECTION (ML) INTRAVENOUS
Status: DISPENSED
Start: 2022-09-06

## (undated) RX ORDER — LIDOCAINE HYDROCHLORIDE 10 MG/ML
INJECTION, SOLUTION EPIDURAL; INFILTRATION; INTRACAUDAL; PERINEURAL
Status: DISPENSED
Start: 2022-09-06

## (undated) RX ORDER — FENTANYL CITRATE 50 UG/ML
INJECTION, SOLUTION INTRAMUSCULAR; INTRAVENOUS
Status: DISPENSED
Start: 2022-09-06

## (undated) RX ORDER — DEXAMETHASONE SODIUM PHOSPHATE 10 MG/ML
INJECTION, SOLUTION INTRAMUSCULAR; INTRAVENOUS
Status: DISPENSED
Start: 2022-09-06